# Patient Record
Sex: MALE | Race: ASIAN | NOT HISPANIC OR LATINO
[De-identification: names, ages, dates, MRNs, and addresses within clinical notes are randomized per-mention and may not be internally consistent; named-entity substitution may affect disease eponyms.]

---

## 2018-01-26 ENCOUNTER — APPOINTMENT (OUTPATIENT)
Dept: UROLOGY | Facility: CLINIC | Age: 63
End: 2018-01-26
Payer: MEDICAID

## 2018-01-26 VITALS
BODY MASS INDEX: 26.68 KG/M2 | HEART RATE: 56 BPM | WEIGHT: 170 LBS | DIASTOLIC BLOOD PRESSURE: 91 MMHG | SYSTOLIC BLOOD PRESSURE: 152 MMHG | RESPIRATION RATE: 16 BRPM | HEIGHT: 67 IN | TEMPERATURE: 98 F

## 2018-01-26 DIAGNOSIS — Z84.1 FAMILY HISTORY OF DISORDERS OF KIDNEY AND URETER: ICD-10-CM

## 2018-01-26 DIAGNOSIS — Z80.3 FAMILY HISTORY OF MALIGNANT NEOPLASM OF BREAST: ICD-10-CM

## 2018-01-26 DIAGNOSIS — N21.0 CALCULUS IN BLADDER: ICD-10-CM

## 2018-01-26 DIAGNOSIS — Z78.9 OTHER SPECIFIED HEALTH STATUS: ICD-10-CM

## 2018-01-26 PROCEDURE — 99205 OFFICE O/P NEW HI 60 MIN: CPT

## 2018-01-30 LAB
APPEARANCE: CLEAR
BACTERIA UR CULT: NORMAL
BACTERIA: NEGATIVE
BILIRUBIN URINE: NEGATIVE
BLOOD URINE: NEGATIVE
COLOR: YELLOW
GLUCOSE QUALITATIVE U: NEGATIVE MG/DL
HYALINE CASTS: 0 /LPF
KETONES URINE: NEGATIVE
LEUKOCYTE ESTERASE URINE: NEGATIVE
MICROSCOPIC-UA: NORMAL
NITRITE URINE: NEGATIVE
PH URINE: 7
PROTEIN URINE: NEGATIVE MG/DL
PSA FREE FLD-MCNC: 29.4
PSA FREE SERPL-MCNC: 0.55 NG/ML
PSA SERPL-MCNC: 1.87 NG/ML
RED BLOOD CELLS URINE: 1 /HPF
SPECIFIC GRAVITY URINE: 1.01
SQUAMOUS EPITHELIAL CELLS: 0 /HPF
UROBILINOGEN URINE: NEGATIVE MG/DL
WHITE BLOOD CELLS URINE: 0 /HPF

## 2018-02-01 ENCOUNTER — FORM ENCOUNTER (OUTPATIENT)
Age: 63
End: 2018-02-01

## 2018-02-02 ENCOUNTER — APPOINTMENT (OUTPATIENT)
Dept: ULTRASOUND IMAGING | Facility: IMAGING CENTER | Age: 63
End: 2018-02-02
Payer: MEDICAID

## 2018-02-02 ENCOUNTER — OUTPATIENT (OUTPATIENT)
Dept: OUTPATIENT SERVICES | Facility: HOSPITAL | Age: 63
LOS: 1 days | End: 2018-02-02
Payer: MEDICAID

## 2018-02-02 DIAGNOSIS — Z98.89 OTHER SPECIFIED POSTPROCEDURAL STATES: Chronic | ICD-10-CM

## 2018-02-02 DIAGNOSIS — N20.0 CALCULUS OF KIDNEY: ICD-10-CM

## 2018-02-02 DIAGNOSIS — N21.0 CALCULUS IN BLADDER: ICD-10-CM

## 2018-02-02 PROCEDURE — 76770 US EXAM ABDO BACK WALL COMP: CPT

## 2018-02-02 PROCEDURE — 76857 US EXAM PELVIC LIMITED: CPT

## 2018-02-02 PROCEDURE — 76770 US EXAM ABDO BACK WALL COMP: CPT | Mod: 26

## 2018-02-02 PROCEDURE — 76857 US EXAM PELVIC LIMITED: CPT | Mod: 26

## 2018-02-18 ENCOUNTER — TRANSCRIPTION ENCOUNTER (OUTPATIENT)
Age: 63
End: 2018-02-18

## 2018-02-23 ENCOUNTER — APPOINTMENT (OUTPATIENT)
Dept: UROLOGY | Facility: CLINIC | Age: 63
End: 2018-02-23
Payer: MEDICAID

## 2018-02-23 DIAGNOSIS — R39.15 URGENCY OF URINATION: ICD-10-CM

## 2018-02-23 DIAGNOSIS — N52.9 MALE ERECTILE DYSFUNCTION, UNSPECIFIED: ICD-10-CM

## 2018-02-23 DIAGNOSIS — N40.1 BENIGN PROSTATIC HYPERPLASIA WITH LOWER URINARY TRACT SYMPMS: ICD-10-CM

## 2018-02-23 DIAGNOSIS — N20.0 CALCULUS OF KIDNEY: ICD-10-CM

## 2018-02-23 DIAGNOSIS — N13.8 BENIGN PROSTATIC HYPERPLASIA WITH LOWER URINARY TRACT SYMPMS: ICD-10-CM

## 2018-02-23 PROCEDURE — 99214 OFFICE O/P EST MOD 30 MIN: CPT

## 2018-04-04 ENCOUNTER — APPOINTMENT (OUTPATIENT)
Dept: PULMONOLOGY | Facility: CLINIC | Age: 63
End: 2018-04-04

## 2018-07-24 PROBLEM — Z78.9 ALCOHOL USE: Status: INACTIVE | Noted: 2018-01-26

## 2018-10-02 ENCOUNTER — APPOINTMENT (OUTPATIENT)
Dept: PULMONOLOGY | Facility: CLINIC | Age: 63
End: 2018-10-02
Payer: MEDICAID

## 2018-10-02 ENCOUNTER — MED ADMIN CHARGE (OUTPATIENT)
Age: 63
End: 2018-10-02

## 2018-10-02 VITALS
RESPIRATION RATE: 18 BRPM | DIASTOLIC BLOOD PRESSURE: 83 MMHG | WEIGHT: 176 LBS | HEART RATE: 60 BPM | TEMPERATURE: 97.9 F | SYSTOLIC BLOOD PRESSURE: 146 MMHG | OXYGEN SATURATION: 98 % | BODY MASS INDEX: 28.28 KG/M2 | HEIGHT: 66 IN

## 2018-10-02 PROCEDURE — G0008: CPT

## 2018-10-02 PROCEDURE — 94729 DIFFUSING CAPACITY: CPT

## 2018-10-02 PROCEDURE — 36415 COLL VENOUS BLD VENIPUNCTURE: CPT

## 2018-10-02 PROCEDURE — 99205 OFFICE O/P NEW HI 60 MIN: CPT | Mod: 25

## 2018-10-02 PROCEDURE — 90686 IIV4 VACC NO PRSV 0.5 ML IM: CPT

## 2018-10-02 PROCEDURE — 94060 EVALUATION OF WHEEZING: CPT

## 2018-10-02 PROCEDURE — 99407 BEHAV CHNG SMOKING > 10 MIN: CPT

## 2018-10-02 PROCEDURE — ZZZZZ: CPT

## 2018-10-02 PROCEDURE — 94726 PLETHYSMOGRAPHY LUNG VOLUMES: CPT

## 2018-10-03 LAB
A1AT SERPL-MCNC: 148 MG/DL
ALBUMIN SERPL ELPH-MCNC: 4.2 G/DL
ALP BLD-CCNC: 84 U/L
ALT SERPL-CCNC: 21 U/L
ANION GAP SERPL CALC-SCNC: 17 MMOL/L
AST SERPL-CCNC: 22 U/L
BASOPHILS # BLD AUTO: 0.04 K/UL
BASOPHILS NFR BLD AUTO: 0.5 %
BILIRUB SERPL-MCNC: <0.2 MG/DL
BUN SERPL-MCNC: 15 MG/DL
CALCIUM SERPL-MCNC: 10.2 MG/DL
CHLORIDE SERPL-SCNC: 102 MMOL/L
CO2 SERPL-SCNC: 24 MMOL/L
CREAT SERPL-MCNC: 1.12 MG/DL
EOSINOPHIL # BLD AUTO: 0.18 K/UL
EOSINOPHIL NFR BLD AUTO: 2.2 %
GLUCOSE SERPL-MCNC: 84 MG/DL
HCT VFR BLD CALC: 47.4 %
HGB BLD-MCNC: 15 G/DL
IMM GRANULOCYTES NFR BLD AUTO: 0.4 %
LYMPHOCYTES # BLD AUTO: 3.67 K/UL
LYMPHOCYTES NFR BLD AUTO: 45.1 %
MAN DIFF?: NORMAL
MCHC RBC-ENTMCNC: 31.6 GM/DL
MCHC RBC-ENTMCNC: 31.6 PG
MCV RBC AUTO: 99.8 FL
MONOCYTES # BLD AUTO: 0.56 K/UL
MONOCYTES NFR BLD AUTO: 6.9 %
NEUTROPHILS # BLD AUTO: 3.65 K/UL
NEUTROPHILS NFR BLD AUTO: 44.9 %
PLATELET # BLD AUTO: 264 K/UL
POTASSIUM SERPL-SCNC: 4 MMOL/L
PROT SERPL-MCNC: 7.8 G/DL
RBC # BLD: 4.75 M/UL
RBC # FLD: 13.2 %
SODIUM SERPL-SCNC: 143 MMOL/L
TSH SERPL-ACNC: 0.71 UIU/ML
WBC # FLD AUTO: 8.13 K/UL

## 2018-10-09 ENCOUNTER — MEDICATION RENEWAL (OUTPATIENT)
Age: 63
End: 2018-10-09

## 2018-10-09 ENCOUNTER — CHART COPY (OUTPATIENT)
Age: 63
End: 2018-10-09

## 2018-10-19 ENCOUNTER — OUTPATIENT (OUTPATIENT)
Dept: OUTPATIENT SERVICES | Facility: HOSPITAL | Age: 63
LOS: 1 days | End: 2018-10-19
Payer: MEDICAID

## 2018-10-19 ENCOUNTER — APPOINTMENT (OUTPATIENT)
Dept: CT IMAGING | Facility: IMAGING CENTER | Age: 63
End: 2018-10-19
Payer: MEDICAID

## 2018-10-19 DIAGNOSIS — F17.200 NICOTINE DEPENDENCE, UNSPECIFIED, UNCOMPLICATED: ICD-10-CM

## 2018-10-19 DIAGNOSIS — Z98.89 OTHER SPECIFIED POSTPROCEDURAL STATES: Chronic | ICD-10-CM

## 2018-10-19 PROCEDURE — G0297: CPT

## 2018-10-19 PROCEDURE — G0297: CPT | Mod: 26

## 2018-12-07 ENCOUNTER — APPOINTMENT (OUTPATIENT)
Dept: PULMONOLOGY | Facility: CLINIC | Age: 63
End: 2018-12-07
Payer: MEDICAID

## 2018-12-07 VITALS
WEIGHT: 176 LBS | TEMPERATURE: 97.8 F | BODY MASS INDEX: 28.28 KG/M2 | HEART RATE: 65 BPM | HEIGHT: 66 IN | DIASTOLIC BLOOD PRESSURE: 86 MMHG | SYSTOLIC BLOOD PRESSURE: 128 MMHG | RESPIRATION RATE: 15 BRPM

## 2018-12-07 PROCEDURE — 99215 OFFICE O/P EST HI 40 MIN: CPT

## 2019-06-03 PROBLEM — N52.9 MALE ERECTILE DISORDER: Status: ACTIVE | Noted: 2018-01-30

## 2021-08-31 ENCOUNTER — APPOINTMENT (OUTPATIENT)
Dept: CARDIOLOGY | Facility: CLINIC | Age: 66
End: 2021-08-31
Payer: MEDICARE

## 2021-08-31 ENCOUNTER — NON-APPOINTMENT (OUTPATIENT)
Age: 66
End: 2021-08-31

## 2021-08-31 VITALS
OXYGEN SATURATION: 98 % | DIASTOLIC BLOOD PRESSURE: 72 MMHG | SYSTOLIC BLOOD PRESSURE: 127 MMHG | HEART RATE: 56 BPM | BODY MASS INDEX: 28.61 KG/M2 | WEIGHT: 178 LBS | HEIGHT: 66 IN

## 2021-08-31 PROCEDURE — 99205 OFFICE O/P NEW HI 60 MIN: CPT

## 2021-08-31 PROCEDURE — 93000 ELECTROCARDIOGRAM COMPLETE: CPT

## 2021-08-31 RX ORDER — TAMSULOSIN HYDROCHLORIDE 0.4 MG/1
0.4 CAPSULE ORAL
Qty: 30 | Refills: 11 | Status: COMPLETED | COMMUNITY
Start: 2018-01-26 | End: 2021-08-31

## 2021-08-31 RX ORDER — TIOTROPIUM BROMIDE 18 UG/1
18 CAPSULE ORAL; RESPIRATORY (INHALATION) DAILY
Qty: 1 | Refills: 2 | Status: COMPLETED | COMMUNITY
Start: 2018-10-04 | End: 2021-08-31

## 2021-08-31 RX ORDER — ROSUVASTATIN CALCIUM 40 MG/1
40 TABLET, FILM COATED ORAL DAILY
Qty: 30 | Refills: 0 | Status: COMPLETED | COMMUNITY
End: 2021-08-31

## 2021-08-31 RX ORDER — SILDENAFIL 20 MG/1
20 TABLET ORAL
Qty: 20 | Refills: 6 | Status: COMPLETED | COMMUNITY
Start: 2018-01-26 | End: 2021-08-31

## 2021-08-31 RX ORDER — TIOTROPIUM BROMIDE INHALATION SPRAY 3.12 UG/1
2.5 SPRAY, METERED RESPIRATORY (INHALATION) DAILY
Qty: 1 | Refills: 5 | Status: ACTIVE | COMMUNITY
Start: 2018-10-09 | End: 1900-01-01

## 2021-08-31 RX ORDER — DICLOFENAC SODIUM 50 MG/1
50 TABLET, DELAYED RELEASE ORAL
Refills: 0 | Status: ACTIVE | COMMUNITY
Start: 2021-08-31

## 2021-09-03 RX ORDER — AMLODIPINE BESYLATE 5 MG/1
5 TABLET ORAL DAILY
Qty: 90 | Refills: 3 | Status: ACTIVE | COMMUNITY
Start: 1900-01-01 | End: 1900-01-01

## 2021-09-03 RX ORDER — ASPIRIN 81 MG/1
81 TABLET, DELAYED RELEASE ORAL
Qty: 90 | Refills: 3 | Status: ACTIVE | COMMUNITY
Start: 2021-08-31 | End: 1900-01-01

## 2021-09-03 RX ORDER — ATORVASTATIN CALCIUM 40 MG/1
40 TABLET, FILM COATED ORAL DAILY
Qty: 90 | Refills: 3 | Status: ACTIVE | COMMUNITY
Start: 2021-08-31 | End: 1900-01-01

## 2021-09-16 ENCOUNTER — APPOINTMENT (OUTPATIENT)
Dept: CARDIOLOGY | Facility: CLINIC | Age: 66
End: 2021-09-16

## 2021-09-24 NOTE — DISCUSSION/SUMMARY
[FreeTextEntry1] : Mr. Nazario presents with exertional dyspnea\par \par Plan:\par 1. Given the symptoms and dyspnea it is appropriate to proceed with angiography, however should see Dr. Allison as well for f/u\par 2. c/w current meds\par 3. Pt and family agree to the plan\par 4. Old records requested and reviewed with performing physician (via [place])\par 5. Primary and secondary prevention of cardiovascular and related conditions discussed at length, including but not limited to diet and lifestyle modification.\par 6. Patient to return to the office in 2-3 months.\par \par Thank you for allowing me to participate in the care of your patient. If you have any questions, please feel free to contact me at [phone] or via email at [email or web address].\par \par Sincerely,\par \par Silvino Smallwood MD [facility]\par  of Cardiology\par Director, Cardiac Catheterization Laboratory\par Director, CHIP/ Program\par [facility]\par [facility]\par Tel: [phone]\par Email: [email or web address]

## 2021-09-24 NOTE — HISTORY OF PRESENT ILLNESS
[FreeTextEntry1] : Mr. Nazario os a 66 year-old man with known exertional dyspnea who was being seen by Dr. Allison but has not followed up and had a stress test in January which demonstrated perfusion defects.

## 2021-10-28 ENCOUNTER — APPOINTMENT (OUTPATIENT)
Dept: CARDIOLOGY | Facility: CLINIC | Age: 66
End: 2021-10-28
Payer: MEDICARE

## 2021-10-28 PROCEDURE — 93306 TTE W/DOPPLER COMPLETE: CPT

## 2021-11-02 ENCOUNTER — APPOINTMENT (OUTPATIENT)
Dept: PULMONOLOGY | Facility: CLINIC | Age: 66
End: 2021-11-02
Payer: MEDICARE

## 2021-11-02 VITALS
TEMPERATURE: 97.5 F | BODY MASS INDEX: 27.64 KG/M2 | HEIGHT: 66 IN | OXYGEN SATURATION: 98 % | WEIGHT: 172 LBS | HEART RATE: 68 BPM | SYSTOLIC BLOOD PRESSURE: 126 MMHG | RESPIRATION RATE: 15 BRPM | DIASTOLIC BLOOD PRESSURE: 76 MMHG

## 2021-11-02 DIAGNOSIS — G47.9 SLEEP DISORDER, UNSPECIFIED: ICD-10-CM

## 2021-11-02 PROCEDURE — 99215 OFFICE O/P EST HI 40 MIN: CPT | Mod: 25

## 2021-11-02 NOTE — HISTORY OF PRESENT ILLNESS
[Nonrestorative Sleep] : nonrestorative sleep [Snoring] : snoring [ESS] : 16 [FreeTextEntry1] : This letter  is regarding your patient  who  attended pulmonary out patient office today.  I have reviewed  patient's  past history, social history, family history and medication list. I also  reviewed nurse practitioners/ and fellows  notes and assessment and agree with it.  ---------\par \par No history of fever, chills , rigors, chest pain, or hemoptysis. Questionable history of Raynaud's phenomenon. No h/o significant weight loss in last few months. No history of liver dysfunction, collagen vascular disorder or chronic thromboembolic disease. I would classify her dyspnea as WHO  FUNCTIONAL CLASS II\par \par Has exertional dyspneic for nearly two years. Current smoker (1 cig / day at most) but formerly 1 PPD for approx 20 years (cut back 25 years ago). Follows with cardiologist (Dr. Montoya). Recently had cardiac stress test (3 months ago approx) which is normal per patient. Does not use inhalers. 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 No BD response\par ---dec 2108---doing well on spiriva\par \par NOV 2021---------- past history of smoking, was on inhaler but stopped on its own being considered for  CARDIAC CATH----- Needs PFT CT scan chest---CATH---\par c/o sleep disturbance, snoring---,,,,------

## 2021-11-02 NOTE — REVIEW OF SYSTEMS
[Dyspnea] : dyspnea [As Noted in HPI] : as noted in HPI [Fever] : no fever [Dry Eyes] : no dryness of the eyes [Hypotension] : no hypotension [Chest Discomfort] : no chest discomfort [Hay Fever] : no hay fever [Itchy Eyes] : no itching of ~T the eyes [Heartburn] : no heartburn [Reflux] : no reflux [Dysuria] : no dysuria [Trauma] : no ~T physical trauma [Raynaud] : no Raynaud's phenomenon was observed [Scleroderma] : no scleroderma [Easy Bruising] : no ~M tendency for easy bruising [Headache] : no headache [Diet Meds] : not taking dietary supplements [Difficulty Initiating Sleep] : no difficulty falling asleep

## 2021-11-02 NOTE — ASSESSMENT
[FreeTextEntry1] : ASSESSMENT  PLAN------ 63 year old male, active smoker, with history of hyperlipidemia presented  with exertional dyspnea. PFTs   2018 consistent with mixed obstructive and restrictive defect. --\par  past history of smoking [ HAS COPD] , was on inhaler but stopped on its own   AS PER CARDIOLOGY   Being considered for  CARDIAC CATH----- Needs PFT CT scan chest---CATH----\par \par --\par 1- cancer screening CT  ---WILL REPEAT CT CHEST   \par 2-START BREZTRI INHALER , REPEAT PFT \par 3 F/U CARDIOLOGY DR HERNANDEZ \par 4- UPTO DATE ON Influenza vaccine \par 5 LABS\par 6- F/U IN JAN 2022\par 7- has features of RADHA- will get HST , ESS16\par \par \par Thanks for allowing  me to participate  in the care of this patient.  Patient at this time  will follow  the above mentioned recommendations and return back for follow up visit. If you have any questions  I can be reached  at # [phone] (office).\par \par Emily Allison MD, FCCP \par Director, Pulmonary Hypertension Program \par [facility] \par Division of Pulmonary, Critical Care and Sleep Medicine \par  Professor of Medicine \par [employer]\par \par RENEE JacksonC\par \par \par \par

## 2021-11-02 NOTE — PHYSICAL EXAM
[General Appearance - Well Developed] : well developed [General Appearance - Well Nourished] : well nourished [Normal Conjunctiva] : the conjunctiva exhibited no abnormalities [III] : III [Heart Rate And Rhythm] : heart rate and rhythm were normal [Heart Sounds] : normal S1 and S2 [Murmurs] : no murmurs present [Edema] : no peripheral edema present [Respiration, Rhythm And Depth] : normal respiratory rhythm and effort [Exaggerated Use Of Accessory Muscles For Inspiration] : no accessory muscle use [Auscultation Breath Sounds / Voice Sounds] : lungs were clear to auscultation bilaterally [Abdomen Soft] : soft [Abdomen Tenderness] : non-tender [Abnormal Walk] : normal gait [Nail Clubbing] : no clubbing of the fingernails [Cyanosis, Localized] : no localized cyanosis [Skin Turgor] : normal skin turgor [Cranial Nerves] : cranial nerves 2-12 were intact [Oriented To Time, Place, And Person] : oriented to person, place, and time [Skin Color & Pigmentation] : normal skin color and pigmentation [] : no rash [FreeTextEntry1] : NO SPINE TENDERNESS

## 2021-11-03 LAB
ALBUMIN SERPL ELPH-MCNC: 4.6 G/DL
ALP BLD-CCNC: 91 U/L
ALT SERPL-CCNC: 27 U/L
ANION GAP SERPL CALC-SCNC: 18 MMOL/L
AST SERPL-CCNC: 19 U/L
BASOPHILS # BLD AUTO: 0.06 K/UL
BASOPHILS NFR BLD AUTO: 0.7 %
BILIRUB SERPL-MCNC: 0.2 MG/DL
BUN SERPL-MCNC: 12 MG/DL
CALCIUM SERPL-MCNC: 9.7 MG/DL
CHLORIDE SERPL-SCNC: 101 MMOL/L
CO2 SERPL-SCNC: 23 MMOL/L
COVID-19 NUCLEOCAPSID  GAM ANTIBODY INTERPRETATION: NEGATIVE
COVID-19 SPIKE DOMAIN ANTIBODY INTERPRETATION: POSITIVE
CREAT SERPL-MCNC: 1.08 MG/DL
DEPRECATED KAPPA LC FREE/LAMBDA SER: 1.08 RATIO
EOSINOPHIL # BLD AUTO: 0.26 K/UL
EOSINOPHIL NFR BLD AUTO: 2.9 %
GLUCOSE SERPL-MCNC: 96 MG/DL
HCT VFR BLD CALC: 46.3 %
HGB BLD-MCNC: 14.9 G/DL
IGA SER QL IEP: 207 MG/DL
IGG SER QL IEP: 1198 MG/DL
IGM SER QL IEP: 56 MG/DL
IMM GRANULOCYTES NFR BLD AUTO: 0.2 %
KAPPA LC CSF-MCNC: 1.47 MG/DL
KAPPA LC SERPL-MCNC: 1.59 MG/DL
LYMPHOCYTES # BLD AUTO: 3.81 K/UL
LYMPHOCYTES NFR BLD AUTO: 43.1 %
MAN DIFF?: NORMAL
MCHC RBC-ENTMCNC: 32.1 PG
MCHC RBC-ENTMCNC: 32.2 GM/DL
MCV RBC AUTO: 99.8 FL
MONOCYTES # BLD AUTO: 0.56 K/UL
MONOCYTES NFR BLD AUTO: 6.3 %
NEUTROPHILS # BLD AUTO: 4.12 K/UL
NEUTROPHILS NFR BLD AUTO: 46.8 %
PLATELET # BLD AUTO: 282 K/UL
POTASSIUM SERPL-SCNC: 4.1 MMOL/L
PROT SERPL-MCNC: 7.4 G/DL
RBC # BLD: 4.64 M/UL
RBC # FLD: 12.4 %
SARS-COV-2 AB SERPL IA-ACNC: >250 U/ML
SARS-COV-2 AB SERPL QL IA: 0.08 INDEX
SODIUM SERPL-SCNC: 141 MMOL/L
TOTAL IGE SMQN RAST: 222 KU/L
WBC # FLD AUTO: 8.83 K/UL

## 2021-11-09 ENCOUNTER — APPOINTMENT (OUTPATIENT)
Dept: CARDIOLOGY | Facility: CLINIC | Age: 66
End: 2021-11-09
Payer: MEDICARE

## 2021-11-09 VITALS
OXYGEN SATURATION: 96 % | WEIGHT: 175 LBS | SYSTOLIC BLOOD PRESSURE: 127 MMHG | DIASTOLIC BLOOD PRESSURE: 82 MMHG | HEART RATE: 60 BPM | HEIGHT: 66 IN | BODY MASS INDEX: 28.12 KG/M2

## 2021-11-09 PROCEDURE — 99215 OFFICE O/P EST HI 40 MIN: CPT

## 2021-11-09 PROCEDURE — 93000 ELECTROCARDIOGRAM COMPLETE: CPT

## 2021-11-09 NOTE — HISTORY OF PRESENT ILLNESS
[FreeTextEntry1] : Mr Nazario is a 66 year old man with known exertional dyspnea who was being seen by Dr. Allison but has not followed up and had a stress test in January which demonstrated perfusion defects. He continues to have class II exertional dyspnea but otherwise stable. Saw Dr Allison recently and planned for repeat PFT and screening CT chest. We discussed proceeding with invasive coronary angiogram and Mr Nazario is agreeable.

## 2021-11-09 NOTE — CARDIOLOGY SUMMARY
[de-identified] : 11/9/2021 - SR [de-identified] : June 2021 - apical ischemia [de-identified] : Oct 2021 - Normal LV function, no significant valvular abnormalities.

## 2021-11-09 NOTE — DISCUSSION/SUMMARY
[Outpatient Evaluation] : outpatient evaluation [None] : none [Patient] : the patient [FreeTextEntry1] : Plan:\par 1. schedule for left heart catheterization - patient understands the r/a/b/c/i of the plan\par 2. repeat lipid profile (will do this on the day of procedure)\par 3. ongoing follow-up with Dr Allison\par 4. discussed non-pharmacological measures including exercise + weight loss\par 5. Old records requested and reviewed with performing physician.\par 6. Primary and secondary prevention of cardiovascular and related conditions discussed at length, including but not limited to diet and lifestyle modification.\par 7. Patient to return to the office in 3 months.\par \par Thank you for allowing me to participate in the care of your patient. If you have any questions, please feel free to contact me at [phone] or via email at [email or web address].\par \par Sincerely,\par \par Silvino Smallwood MD [facility]\par Director of Cardiac Catheterization Laboratory\par Director CHIP/ Program\par [facility] Lead Interventional Cardiology\par [facility]\par [facility]

## 2021-11-13 ENCOUNTER — APPOINTMENT (OUTPATIENT)
Dept: DISASTER EMERGENCY | Facility: CLINIC | Age: 66
End: 2021-11-13

## 2021-11-13 DIAGNOSIS — Z01.818 ENCOUNTER FOR OTHER PREPROCEDURAL EXAMINATION: ICD-10-CM

## 2021-11-14 LAB — SARS-COV-2 N GENE NPH QL NAA+PROBE: NOT DETECTED

## 2021-11-16 ENCOUNTER — OUTPATIENT (OUTPATIENT)
Dept: OUTPATIENT SERVICES | Facility: HOSPITAL | Age: 66
LOS: 1 days | End: 2021-11-16
Payer: COMMERCIAL

## 2021-11-16 VITALS
HEIGHT: 68 IN | DIASTOLIC BLOOD PRESSURE: 88 MMHG | TEMPERATURE: 98 F | SYSTOLIC BLOOD PRESSURE: 161 MMHG | WEIGHT: 171.96 LBS | HEART RATE: 62 BPM | RESPIRATION RATE: 16 BRPM | OXYGEN SATURATION: 97 %

## 2021-11-16 VITALS
RESPIRATION RATE: 20 BRPM | OXYGEN SATURATION: 97 % | SYSTOLIC BLOOD PRESSURE: 154 MMHG | DIASTOLIC BLOOD PRESSURE: 94 MMHG | HEART RATE: 67 BPM

## 2021-11-16 DIAGNOSIS — Z98.89 OTHER SPECIFIED POSTPROCEDURAL STATES: Chronic | ICD-10-CM

## 2021-11-16 DIAGNOSIS — R94.39 ABNORMAL RESULT OF OTHER CARDIOVASCULAR FUNCTION STUDY: ICD-10-CM

## 2021-11-16 LAB
ALBUMIN SERPL ELPH-MCNC: 4.4 G/DL — SIGNIFICANT CHANGE UP (ref 3.3–5)
ALP SERPL-CCNC: 84 U/L — SIGNIFICANT CHANGE UP (ref 40–120)
ALT FLD-CCNC: 25 U/L — SIGNIFICANT CHANGE UP (ref 10–45)
ANION GAP SERPL CALC-SCNC: 14 MMOL/L — SIGNIFICANT CHANGE UP (ref 5–17)
AST SERPL-CCNC: 40 U/L — SIGNIFICANT CHANGE UP (ref 10–40)
BILIRUB SERPL-MCNC: 0.4 MG/DL — SIGNIFICANT CHANGE UP (ref 0.2–1.2)
BUN SERPL-MCNC: 14 MG/DL — SIGNIFICANT CHANGE UP (ref 7–23)
CALCIUM SERPL-MCNC: 9.5 MG/DL — SIGNIFICANT CHANGE UP (ref 8.4–10.5)
CHLORIDE SERPL-SCNC: 103 MMOL/L — SIGNIFICANT CHANGE UP (ref 96–108)
CHOLEST SERPL-MCNC: 149 MG/DL — SIGNIFICANT CHANGE UP
CO2 SERPL-SCNC: 21 MMOL/L — LOW (ref 22–31)
CREAT SERPL-MCNC: 0.92 MG/DL — SIGNIFICANT CHANGE UP (ref 0.5–1.3)
GLUCOSE SERPL-MCNC: 83 MG/DL — SIGNIFICANT CHANGE UP (ref 70–99)
HCT VFR BLD CALC: 44.6 % — SIGNIFICANT CHANGE UP (ref 39–50)
HDLC SERPL-MCNC: 54 MG/DL — SIGNIFICANT CHANGE UP
HGB BLD-MCNC: 14.5 G/DL — SIGNIFICANT CHANGE UP (ref 13–17)
LIPID PNL WITH DIRECT LDL SERPL: 59 MG/DL — SIGNIFICANT CHANGE UP
MCHC RBC-ENTMCNC: 31.7 PG — SIGNIFICANT CHANGE UP (ref 27–34)
MCHC RBC-ENTMCNC: 32.5 GM/DL — SIGNIFICANT CHANGE UP (ref 32–36)
MCV RBC AUTO: 97.4 FL — SIGNIFICANT CHANGE UP (ref 80–100)
NON HDL CHOLESTEROL: 94 MG/DL — SIGNIFICANT CHANGE UP
NRBC # BLD: 0 /100 WBCS — SIGNIFICANT CHANGE UP (ref 0–0)
PLATELET # BLD AUTO: 293 K/UL — SIGNIFICANT CHANGE UP (ref 150–400)
POTASSIUM SERPL-MCNC: 6.1 MMOL/L — HIGH (ref 3.5–5.3)
POTASSIUM SERPL-SCNC: 6.1 MMOL/L — HIGH (ref 3.5–5.3)
PROT SERPL-MCNC: 7.5 G/DL — SIGNIFICANT CHANGE UP (ref 6–8.3)
RBC # BLD: 4.58 M/UL — SIGNIFICANT CHANGE UP (ref 4.2–5.8)
RBC # FLD: 12.3 % — SIGNIFICANT CHANGE UP (ref 10.3–14.5)
SODIUM SERPL-SCNC: 138 MMOL/L — SIGNIFICANT CHANGE UP (ref 135–145)
TRIGL SERPL-MCNC: 178 MG/DL — HIGH
WBC # BLD: 10.2 K/UL — SIGNIFICANT CHANGE UP (ref 3.8–10.5)
WBC # FLD AUTO: 10.2 K/UL — SIGNIFICANT CHANGE UP (ref 3.8–10.5)

## 2021-11-16 PROCEDURE — 93010 ELECTROCARDIOGRAM REPORT: CPT

## 2021-11-16 PROCEDURE — 93454 CORONARY ARTERY ANGIO S&I: CPT

## 2021-11-16 PROCEDURE — C1894: CPT

## 2021-11-16 PROCEDURE — 85027 COMPLETE CBC AUTOMATED: CPT

## 2021-11-16 PROCEDURE — 80053 COMPREHEN METABOLIC PANEL: CPT

## 2021-11-16 PROCEDURE — 80061 LIPID PANEL: CPT

## 2021-11-16 PROCEDURE — C1887: CPT

## 2021-11-16 PROCEDURE — 99152 MOD SED SAME PHYS/QHP 5/>YRS: CPT

## 2021-11-16 PROCEDURE — C1769: CPT

## 2021-11-16 PROCEDURE — 93454 CORONARY ARTERY ANGIO S&I: CPT | Mod: 26

## 2021-11-16 PROCEDURE — 93005 ELECTROCARDIOGRAM TRACING: CPT

## 2021-11-16 RX ORDER — SODIUM CHLORIDE 9 MG/ML
3 INJECTION INTRAMUSCULAR; INTRAVENOUS; SUBCUTANEOUS EVERY 8 HOURS
Refills: 0 | Status: DISCONTINUED | OUTPATIENT
Start: 2021-11-16 | End: 2021-11-30

## 2021-11-16 NOTE — H&P CARDIOLOGY - HISTORY OF PRESENT ILLNESS
66 yr old male with no implantable cardiac monitoring device and PMH of former smoker, htn, hld, kidney stone s/p lithotripsy presents today for cardiac catheterization. Patient reports COYNE for the past 2 years and has noticed intermittent  left sided CP, non radiating at rest over the past 6 months. Evaluated by Dr Smallwood where NST in June 2021 showed apical ischemia.    TTE 10/28/21 min MR  Grossly normal LV systolic function. EF 62%    Referred today for C 66 yr old male with no implantable cardiac monitoring device and PMH of former smoker, htn, hld, kidney stone s/p lithotripsy presents today for cardiac catheterization. Patient reports COYNE for the past 2 years and has noticed intermittent  left sided CP, non radiating at rest over the past 6 months. Underwent  NST on 6/29/21 at [facility] which showed small reversible perfusion defect of the apex.  Subsequently was evaluated by Dr Smallwood where TTE was done 10/28/21 showing -  Min MR  Grossly normal LV systolic function. EF 62%      Referred today for [facility]

## 2021-11-16 NOTE — H&P CARDIOLOGY - NSICDXPASTMEDICALHX_GEN_ALL_CORE_FT
PAST MEDICAL HISTORY:  Former smoker     Gallstone     Hypercholesterolemia     Hyperthyroidism     Kidney stone

## 2021-11-16 NOTE — H&P CARDIOLOGY - NSICDXFAMILYHX_GEN_ALL_CORE_FT
FAMILY HISTORY:  Father  Still living? Unknown  Family history of hypertension in father, Age at diagnosis: Age Unknown  Family history of kidney stone, Age at diagnosis: Age Unknown

## 2021-11-16 NOTE — ASU DISCHARGE PLAN (ADULT/PEDIATRIC) - CARE PROVIDER_API CALL
Silvino Smallwood)  Cardiology; Internal Medicine; Interventional Cardiology  [facility]- Dept of Cardiology, [address] [place] [address]  Phone: [phone]  Fax: [phone]  Established Patient  Follow Up Time: 2 weeks

## 2021-11-17 PROBLEM — Z87.891 PERSONAL HISTORY OF NICOTINE DEPENDENCE: Chronic | Status: ACTIVE | Noted: 2021-11-16

## 2021-11-22 ENCOUNTER — NON-APPOINTMENT (OUTPATIENT)
Age: 66
End: 2021-11-22

## 2021-11-22 ENCOUNTER — APPOINTMENT (OUTPATIENT)
Dept: THORACIC SURGERY | Facility: CLINIC | Age: 66
End: 2021-11-22
Payer: MEDICARE

## 2021-11-22 VITALS — HEIGHT: 66 IN | BODY MASS INDEX: 27.64 KG/M2 | WEIGHT: 172 LBS

## 2021-11-22 DIAGNOSIS — Z12.2 ENCOUNTER FOR SCREENING FOR MALIGNANT NEOPLASM OF RESPIRATORY ORGANS: ICD-10-CM

## 2021-11-22 PROCEDURE — G0296 VISIT TO DETERM LDCT ELIG: CPT

## 2021-11-26 ENCOUNTER — OUTPATIENT (OUTPATIENT)
Dept: OUTPATIENT SERVICES | Facility: HOSPITAL | Age: 66
LOS: 1 days | End: 2021-11-26
Payer: COMMERCIAL

## 2021-11-26 ENCOUNTER — APPOINTMENT (OUTPATIENT)
Dept: CT IMAGING | Facility: IMAGING CENTER | Age: 66
End: 2021-11-26
Payer: MEDICARE

## 2021-11-26 DIAGNOSIS — Z98.89 OTHER SPECIFIED POSTPROCEDURAL STATES: Chronic | ICD-10-CM

## 2021-11-26 DIAGNOSIS — R05.9 COUGH, UNSPECIFIED: ICD-10-CM

## 2021-11-26 PROCEDURE — 71271 CT THORAX LUNG CANCER SCR C-: CPT

## 2021-11-26 PROCEDURE — 71271 CT THORAX LUNG CANCER SCR C-: CPT | Mod: 26

## 2021-11-30 ENCOUNTER — TRANSCRIPTION ENCOUNTER (OUTPATIENT)
Age: 66
End: 2021-11-30

## 2021-12-02 NOTE — HISTORY OF PRESENT ILLNESS
[TextBox_13] : He denies hemoptysis, denies new cough, denies unexplained weight loss.\par He is a former smoker with a 27 pack year smoking history (0.75PPD x 36 years).  He quit 11 years ago.  He is referred by Dr. Kayla Allison.

## 2021-12-09 ENCOUNTER — APPOINTMENT (OUTPATIENT)
Dept: SLEEP CENTER | Facility: CLINIC | Age: 66
End: 2021-12-09
Payer: MEDICARE

## 2021-12-09 ENCOUNTER — OUTPATIENT (OUTPATIENT)
Dept: OUTPATIENT SERVICES | Facility: HOSPITAL | Age: 66
LOS: 1 days | End: 2021-12-09
Payer: COMMERCIAL

## 2021-12-09 DIAGNOSIS — Z98.89 OTHER SPECIFIED POSTPROCEDURAL STATES: Chronic | ICD-10-CM

## 2021-12-09 PROCEDURE — 95806 SLEEP STUDY UNATT&RESP EFFT: CPT

## 2021-12-09 PROCEDURE — 95806 SLEEP STUDY UNATT&RESP EFFT: CPT | Mod: 26

## 2021-12-14 ENCOUNTER — APPOINTMENT (OUTPATIENT)
Dept: CARDIOLOGY | Facility: CLINIC | Age: 66
End: 2021-12-14
Payer: MEDICARE

## 2021-12-14 VITALS
WEIGHT: 172 LBS | HEIGHT: 66 IN | HEART RATE: 57 BPM | DIASTOLIC BLOOD PRESSURE: 81 MMHG | BODY MASS INDEX: 27.64 KG/M2 | OXYGEN SATURATION: 96 % | SYSTOLIC BLOOD PRESSURE: 126 MMHG

## 2021-12-14 DIAGNOSIS — F17.200 NICOTINE DEPENDENCE, UNSPECIFIED, UNCOMPLICATED: ICD-10-CM

## 2021-12-14 PROCEDURE — 99214 OFFICE O/P EST MOD 30 MIN: CPT

## 2021-12-14 PROCEDURE — 93000 ELECTROCARDIOGRAM COMPLETE: CPT

## 2021-12-14 NOTE — HISTORY OF PRESENT ILLNESS
[FreeTextEntry1] : Mr Nazario is a 66 year old man with known exertional dyspnea who was being seen by Dr. Allison but has not followed up and had a stress test in January which demonstrated perfusion defects. He continues to have class II exertional dyspnea but otherwise stable. Saw Dr Allison recently and planned for repeat PFT and screening CT chest. We discussed proceeding with invasive coronary angiogram and Mr Nazario is agreeable. \par \par Coronary angiogram was performed on Nov 16 which showed minor non-obstructive epicardial coronary disease. He felt reassured after the angiogram and has not been experience any significant cardiac symptoms. Fasting LDL done at the time of angiogram was 59 mg/dL but TG mildly elevated 178 mg/dL. Recent sleep study showed mild RADHA (AHI 4.4). We discussed management goals moving forward with focus on non-pharmacological measures including weight loss.

## 2021-12-16 DIAGNOSIS — G47.33 OBSTRUCTIVE SLEEP APNEA (ADULT) (PEDIATRIC): ICD-10-CM

## 2021-12-22 ENCOUNTER — APPOINTMENT (OUTPATIENT)
Dept: SLEEP CENTER | Facility: CLINIC | Age: 66
End: 2021-12-22

## 2022-01-20 ENCOUNTER — APPOINTMENT (OUTPATIENT)
Dept: PULMONOLOGY | Facility: CLINIC | Age: 67
End: 2022-01-20
Payer: MEDICARE

## 2022-01-20 VITALS
HEART RATE: 71 BPM | HEIGHT: 66 IN | DIASTOLIC BLOOD PRESSURE: 80 MMHG | OXYGEN SATURATION: 97 % | SYSTOLIC BLOOD PRESSURE: 150 MMHG | TEMPERATURE: 98 F | WEIGHT: 171 LBS | BODY MASS INDEX: 27.48 KG/M2

## 2022-01-20 VITALS
HEART RATE: 68 BPM | TEMPERATURE: 98 F | WEIGHT: 171 LBS | OXYGEN SATURATION: 98 % | HEIGHT: 66 IN | SYSTOLIC BLOOD PRESSURE: 137 MMHG | RESPIRATION RATE: 15 BRPM | DIASTOLIC BLOOD PRESSURE: 86 MMHG | BODY MASS INDEX: 27.48 KG/M2

## 2022-01-20 DIAGNOSIS — R05.9 COUGH, UNSPECIFIED: ICD-10-CM

## 2022-01-20 PROCEDURE — ZZZZZ: CPT

## 2022-01-20 PROCEDURE — 99214 OFFICE O/P EST MOD 30 MIN: CPT | Mod: 25

## 2022-01-20 PROCEDURE — 94010 BREATHING CAPACITY TEST: CPT

## 2022-01-20 PROCEDURE — 94726 PLETHYSMOGRAPHY LUNG VOLUMES: CPT

## 2022-01-20 PROCEDURE — 94729 DIFFUSING CAPACITY: CPT

## 2022-01-20 RX ORDER — BUDESONIDE, GLYCOPYRROLATE, AND FORMOTEROL FUMARATE 160; 9; 4.8 UG/1; UG/1; UG/1
160-9-4.8 AEROSOL, METERED RESPIRATORY (INHALATION)
Qty: 1 | Refills: 2 | Status: ACTIVE | COMMUNITY
Start: 2021-11-02 | End: 1900-01-01

## 2022-01-20 NOTE — HISTORY OF PRESENT ILLNESS
[Nonrestorative Sleep] : nonrestorative sleep [Snoring] : snoring [TextBox_4] : This letter  is regarding your patient  who  attended pulmonary out patient office today.  I have reviewed  patient's  past history, social history, family history and medication list. I also  reviewed nurse practitioners/ and fellows  notes and assessment and agree with it.  ---------\par \par No history of fever, chills , rigors, chest pain, or hemoptysis. Questionable history of Raynaud's phenomenon. No h/o significant weight loss in last few months. No history of liver dysfunction, collagen vascular disorder or chronic thromboembolic disease. I would classify her dyspnea as WHO  FUNCTIONAL CLASS II\par \par Has exertional dyspneic for nearly two years. Current smoker (1 cig / day at most) but formerly 1 PPD for approx 20 years (cut back 25 years ago). Follows with cardiologist (Dr. Montoya). Recently had cardiac stress test (3 months ago approx) which is normal per patient. Does not use inhalers. 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 No BD response\par PFT  2022- COMBINE RESTRICTIVE AND OBSTRUCTIVE DEFECT\par -RADHA   DEEPALI  4.4  \par \par Clear lung fields-----CT CHEST 2021----\par \par --dec 2108---doing well on spiriva\par \par NOV 2021---------- past history of smoking, was on inhaler but stopped on its own being considered for  CARDIAC CATH----- Needs PFT CT scan chest---CATH---\par c/o sleep disturbance, snoring---,,,,------\par \par CATH AT [facility] -----minor irregularITY IN  THE VESSELS----advised medical treatment following up with cardiologist\par \par JAN 2022----feels much better-------- no respiratory complaints -----PFTs shows combined restrictive and obstructive defect.  Cardiopulmonary exercise test-------- no evidence of significant sleep apnea [ESS] : 16

## 2022-01-20 NOTE — ASSESSMENT
[FreeTextEntry1] : ASSESSMENT  PLAN------ 67 year old male, active smoker, with history of hyperlipidemia presented  with exertional dyspnea. PFTs   2018 consistent with mixed obstructive and restrictive defect. --\par  past history of smoking [ HAS COPD] , was on inhaler but stopped on its own     cardiac cath shows no significant coronary artery disease ------ PFT shows combined obstructive and restrictive defects------ sleep study does not show any significant sleep-\par \par \par 1-  CT CHEST   --CLEAR LUNGS  \par 2-START BREZTRI INHALER , REPEAT PFT \par 3 F/U CARDIOLOGY DR HERNANDEZ \par 4- UPTO DATE ON Influenza vaccine \par 5 LABS\par 6- F/U IN MAY   2022\par 7- PSG----no evidence of significant sleep apnea\par We will obtain cardiopulmonary exercise test ----\par \par \par \par Thanks for allowing  me to participate  in the care of this patient.  Patient at this time  will follow  the above mentioned recommendations and return back for follow up visit. If you have any questions  I can be reached  at # [phone] (office).\par \par Emily Allison MD, FCCP \par Director, Pulmonary Hypertension Program \par [facility] \par Division of Pulmonary, Critical Care and Sleep Medicine \par  Professor of Medicine \par [employer]\par \par ROMMEL Jackson\par \par \par \par

## 2022-01-21 LAB
ALBUMIN SERPL ELPH-MCNC: 4.6 G/DL
ALP BLD-CCNC: 91 U/L
ALT SERPL-CCNC: 24 U/L
ANION GAP SERPL CALC-SCNC: 13 MMOL/L
AST SERPL-CCNC: 20 U/L
BASOPHILS # BLD AUTO: 0.05 K/UL
BASOPHILS NFR BLD AUTO: 0.6 %
BILIRUB SERPL-MCNC: 0.3 MG/DL
BUN SERPL-MCNC: 18 MG/DL
CALCIUM SERPL-MCNC: 9.6 MG/DL
CHLORIDE SERPL-SCNC: 106 MMOL/L
CO2 SERPL-SCNC: 22 MMOL/L
CREAT SERPL-MCNC: 1.04 MG/DL
EOSINOPHIL # BLD AUTO: 0.27 K/UL
EOSINOPHIL NFR BLD AUTO: 3.1 %
GLUCOSE SERPL-MCNC: 80 MG/DL
HCT VFR BLD CALC: 46.3 %
HGB BLD-MCNC: 15 G/DL
IMM GRANULOCYTES NFR BLD AUTO: 0.3 %
LYMPHOCYTES # BLD AUTO: 3.85 K/UL
LYMPHOCYTES NFR BLD AUTO: 44.6 %
MAN DIFF?: NORMAL
MCHC RBC-ENTMCNC: 31.5 PG
MCHC RBC-ENTMCNC: 32.4 GM/DL
MCV RBC AUTO: 97.3 FL
MONOCYTES # BLD AUTO: 0.64 K/UL
MONOCYTES NFR BLD AUTO: 7.4 %
NEUTROPHILS # BLD AUTO: 3.79 K/UL
NEUTROPHILS NFR BLD AUTO: 44 %
PLATELET # BLD AUTO: 280 K/UL
POTASSIUM SERPL-SCNC: 4.3 MMOL/L
PROT SERPL-MCNC: 7.1 G/DL
RBC # BLD: 4.76 M/UL
RBC # FLD: 12.6 %
SODIUM SERPL-SCNC: 141 MMOL/L
TOTAL IGE SMQN RAST: 561 KU/L
WBC # FLD AUTO: 8.63 K/UL

## 2022-01-24 ENCOUNTER — APPOINTMENT (OUTPATIENT)
Dept: GASTROENTEROLOGY | Facility: CLINIC | Age: 67
End: 2022-01-24
Payer: MEDICARE

## 2022-01-24 VITALS
HEIGHT: 68 IN | WEIGHT: 172 LBS | SYSTOLIC BLOOD PRESSURE: 147 MMHG | HEART RATE: 58 BPM | BODY MASS INDEX: 26.07 KG/M2 | OXYGEN SATURATION: 98 % | TEMPERATURE: 97 F | DIASTOLIC BLOOD PRESSURE: 85 MMHG

## 2022-01-24 DIAGNOSIS — R12 HEARTBURN: ICD-10-CM

## 2022-01-24 DIAGNOSIS — Z12.11 ENCOUNTER FOR SCREENING FOR MALIGNANT NEOPLASM OF COLON: ICD-10-CM

## 2022-01-24 PROCEDURE — 99205 OFFICE O/P NEW HI 60 MIN: CPT

## 2022-01-24 RX ORDER — SODIUM SULFATE, POTASSIUM SULFATE, MAGNESIUM SULFATE 17.5; 3.13; 1.6 G/ML; G/ML; G/ML
17.5-3.13-1.6 SOLUTION, CONCENTRATE ORAL
Qty: 1 | Refills: 0 | Status: ACTIVE | COMMUNITY
Start: 2022-01-24 | End: 1900-01-01

## 2022-01-27 LAB
ALTERN TENCAPG(M6): 5.3 MCG/ML
ASPER FUMCAPG(M3): 47.1 MCG/ML
AUREOBASCAPG(M12): 5.1 MCG/ML
MICROPOLYCAPG(M22): <2 MCG/ML
PENIC CHRYCAPG(M1): 34.8 MCG/ML
PHOMA BETAE IGG: 8.5 MCG/ML
THERMOCAPG(M23): 4.4 MCG/ML
TRICHODERMA VIRIDE IGG: 2.4 MCG/ML

## 2022-02-14 ENCOUNTER — RX CHANGE (OUTPATIENT)
Age: 67
End: 2022-02-14

## 2022-02-14 RX ORDER — MONTELUKAST 10 MG/1
10 TABLET, FILM COATED ORAL
Qty: 30 | Refills: 2 | Status: DISCONTINUED | COMMUNITY
Start: 2022-01-20 | End: 2022-02-14

## 2022-02-25 ENCOUNTER — OUTPATIENT (OUTPATIENT)
Dept: OUTPATIENT SERVICES | Facility: HOSPITAL | Age: 67
LOS: 1 days | End: 2022-02-25

## 2022-02-25 VITALS
RESPIRATION RATE: 16 BRPM | SYSTOLIC BLOOD PRESSURE: 140 MMHG | WEIGHT: 171.96 LBS | DIASTOLIC BLOOD PRESSURE: 80 MMHG | TEMPERATURE: 97 F | HEIGHT: 67 IN | HEART RATE: 71 BPM | OXYGEN SATURATION: 99 %

## 2022-02-25 DIAGNOSIS — R12 HEARTBURN: ICD-10-CM

## 2022-02-25 DIAGNOSIS — Z12.11 ENCOUNTER FOR SCREENING FOR MALIGNANT NEOPLASM OF COLON: ICD-10-CM

## 2022-02-25 DIAGNOSIS — I10 ESSENTIAL (PRIMARY) HYPERTENSION: ICD-10-CM

## 2022-02-25 DIAGNOSIS — Z98.89 OTHER SPECIFIED POSTPROCEDURAL STATES: Chronic | ICD-10-CM

## 2022-02-25 RX ORDER — SODIUM CHLORIDE 9 MG/ML
1000 INJECTION, SOLUTION INTRAVENOUS
Refills: 0 | Status: DISCONTINUED | OUTPATIENT
Start: 2022-03-08 | End: 2022-03-22

## 2022-02-25 RX ORDER — BUDESONIDE, GLYCOPYRROLATE, AND FORMOTEROL FUMARATE 160; 9; 4.8 UG/1; UG/1; UG/1
2 AEROSOL, METERED RESPIRATORY (INHALATION)
Qty: 0 | Refills: 0 | DISCHARGE

## 2022-02-25 RX ORDER — AMLODIPINE BESYLATE 2.5 MG/1
1 TABLET ORAL
Qty: 0 | Refills: 0 | DISCHARGE

## 2022-02-25 RX ORDER — DICLOFENAC SODIUM 75 MG/1
1 TABLET, DELAYED RELEASE ORAL
Qty: 0 | Refills: 0 | DISCHARGE

## 2022-02-25 RX ORDER — TIOTROPIUM BROMIDE 18 UG/1
2 CAPSULE ORAL; RESPIRATORY (INHALATION)
Qty: 0 | Refills: 0 | DISCHARGE

## 2022-02-25 RX ORDER — ATORVASTATIN CALCIUM 80 MG/1
1 TABLET, FILM COATED ORAL
Qty: 0 | Refills: 0 | DISCHARGE

## 2022-02-25 RX ORDER — ASPIRIN/CALCIUM CARB/MAGNESIUM 324 MG
1 TABLET ORAL
Qty: 0 | Refills: 0 | DISCHARGE

## 2022-02-25 NOTE — H&P PST ADULT - PROBLEM SELECTOR PLAN 1
Patient tentatively scheduled for Esophagogastroduodenoscopy colonoscopy on 3/8/22.  Pre-op instructions provided. Pt given verbal and written instructions with teach back  Pt verbalized understanding with return demonstration.   Covid testing scheduled prior to surgery as per patient.  RADHA precautions

## 2022-02-25 NOTE — H&P PST ADULT - PROBLEM SELECTOR PLAN 2
Patient instructed to take amlodipine with a sip of water on the morning of procedure.   Recent Echo, Stress, Cath and Sleep Study results in chart

## 2022-02-25 NOTE — H&P PST ADULT - PRIMARY CARE PROVIDER
Dr Derrek Martínez (PCP) [phone]                                                             Dr Bev Ontiveros (cardiology) [phone]

## 2022-02-25 NOTE — H&P PST ADULT - HISTORY OF PRESENT ILLNESS
67 year old male with PMH of HTN, HLD,  presents to Presurgical testing with diagnosis of Heartburn scheduled for Esophagogastroduodenoscopy colonoscopy

## 2022-03-08 ENCOUNTER — RESULT REVIEW (OUTPATIENT)
Age: 67
End: 2022-03-08

## 2022-03-08 ENCOUNTER — OUTPATIENT (OUTPATIENT)
Dept: OUTPATIENT SERVICES | Facility: HOSPITAL | Age: 67
LOS: 1 days | Discharge: HOME | End: 2022-03-08
Payer: MEDICARE

## 2022-03-08 ENCOUNTER — NON-APPOINTMENT (OUTPATIENT)
Age: 67
End: 2022-03-08

## 2022-03-08 ENCOUNTER — APPOINTMENT (OUTPATIENT)
Dept: GASTROENTEROLOGY | Facility: HOSPITAL | Age: 67
End: 2022-03-08

## 2022-03-08 VITALS
SYSTOLIC BLOOD PRESSURE: 124 MMHG | WEIGHT: 173.94 LBS | HEIGHT: 67 IN | HEART RATE: 56 BPM | RESPIRATION RATE: 16 BRPM | DIASTOLIC BLOOD PRESSURE: 70 MMHG | TEMPERATURE: 98 F | OXYGEN SATURATION: 99 %

## 2022-03-08 VITALS
HEART RATE: 66 BPM | DIASTOLIC BLOOD PRESSURE: 87 MMHG | SYSTOLIC BLOOD PRESSURE: 134 MMHG | RESPIRATION RATE: 20 BRPM | OXYGEN SATURATION: 99 %

## 2022-03-08 DIAGNOSIS — R12 HEARTBURN: ICD-10-CM

## 2022-03-08 DIAGNOSIS — Z98.89 OTHER SPECIFIED POSTPROCEDURAL STATES: Chronic | ICD-10-CM

## 2022-03-08 PROCEDURE — 45380 COLONOSCOPY AND BIOPSY: CPT | Mod: 59

## 2022-03-08 PROCEDURE — 43239 EGD BIOPSY SINGLE/MULTIPLE: CPT

## 2022-03-08 PROCEDURE — 45385 COLONOSCOPY W/LESION REMOVAL: CPT

## 2022-03-08 PROCEDURE — 88305 TISSUE EXAM BY PATHOLOGIST: CPT | Mod: 26

## 2022-03-10 NOTE — ASU PREOP CHECKLIST - TEMPERATURE IN CELSIUS (DEGREES C)
Subjective Doing well, resting in bed. Denies abnormal pain. Tolbert catheter in place with small amounts of hematuria. PD cath in place. Lap sites c/d/i.    Objective     I/O's    Intake/Output Summary (Last 24 hours) at 3/10/2022 0848  Last data filed at 3/9/2022 2200  Gross per 24 hour   Intake 1500 ml   Output 50 ml   Net 1450 ml       Last Recorded Vitals  Blood pressure (!) 163/68, pulse 66, temperature 97.5 °F (36.4 °C), temperature source Oral, resp. rate 16, height 5' 8\" (1.727 m), weight 73.4 kg (161 lb 13.1 oz), SpO2 98 %.  Body mass index is 24.6 kg/m².    Physical Exam  Vitals reviewed.   Abdominal:      Comments: Lap sites c/d/i.  PD cath.   Genitourinary:     Comments: tolbert  Skin:     General: Skin is warm.      Capillary Refill: Capillary refill takes less than 2 seconds.   Neurological:      General: No focal deficit present.      Mental Status: He is alert.   Psychiatric:         Mood and Affect: Mood normal.         Labs       Imaging      Assessment & Plan   Patient Active Problem List   Diagnosis   • Preoperative clearance   • Essential hypertension   • Mixed hyperlipidemia   • Peritoneal dialysis catheter in situ (CMS/HCC)   • History of kidney cancer       DVT Prophylaxis      Smoking Cessation  Counseling given: Not Answered  Comment: SMOKED IN 1980'S          Dx: left renal mass.    S/p Extensive laparoscopic lysis of abdominal adhesions. Left robot assisted laparoscopic radical nephrectomy on 3/9/22 with Dr. Muhammad.    POD#1.    Advance diet as tolerated.  Encourage IS  Ambulate TID  Continue Tolbert cath  [place] PRN.     Will continue to monitor.          36.6

## 2022-03-15 LAB — SURGICAL PATHOLOGY STUDY: SIGNIFICANT CHANGE UP

## 2022-03-30 ENCOUNTER — APPOINTMENT (OUTPATIENT)
Dept: PULMONOLOGY | Facility: CLINIC | Age: 67
End: 2022-03-30

## 2022-05-10 ENCOUNTER — NON-APPOINTMENT (OUTPATIENT)
Age: 67
End: 2022-05-10

## 2022-05-10 ENCOUNTER — APPOINTMENT (OUTPATIENT)
Dept: CARDIOLOGY | Facility: CLINIC | Age: 67
End: 2022-05-10
Payer: MEDICARE

## 2022-05-10 VITALS
DIASTOLIC BLOOD PRESSURE: 82 MMHG | HEIGHT: 68 IN | BODY MASS INDEX: 26.98 KG/M2 | HEART RATE: 49 BPM | SYSTOLIC BLOOD PRESSURE: 134 MMHG | OXYGEN SATURATION: 95 % | WEIGHT: 178 LBS

## 2022-05-10 PROBLEM — I10 ESSENTIAL (PRIMARY) HYPERTENSION: Chronic | Status: ACTIVE | Noted: 2022-02-25

## 2022-05-10 PROCEDURE — 93000 ELECTROCARDIOGRAM COMPLETE: CPT

## 2022-05-10 PROCEDURE — 99215 OFFICE O/P EST HI 40 MIN: CPT

## 2022-05-25 NOTE — DISCUSSION/SUMMARY
[FreeTextEntry1] : \par Plan:\par 1. continue current medications\par 2. ongoing non-pharmacological measures - diet + exercise\par 3. ongoing follow-up Dr Allison\par 4. Old records requested and reviewed with performing physician.\par 5. Primary and secondary prevention of cardiovascular and related conditions discussed at length, including but not limited to diet and lifestyle modification.\par 6. Patient to return to the office in 6 months.

## 2022-05-25 NOTE — HISTORY OF PRESENT ILLNESS
[FreeTextEntry1] : Mr Nazario is a 66 year old man with known exertional dyspnea who was being seen by Dr. Allison but has not followed up and had a stress test in January which demonstrated perfusion defects. He continues to have class II exertional dyspnea but otherwise stable. Saw Dr Allison recently and planned for repeat PFT and screening CT chest. We discussed proceeding with invasive coronary angiogram and Mr Nazario is agreeable. \par \par Coronary angiogram was performed on Nov 16 which showed minor non-obstructive epicardial coronary disease. He felt reassured after the angiogram and has not been experience any significant cardiac symptoms. Fasting LDL done at the time of angiogram was 59 mg/dL but TG mildly elevated 178 mg/dL. Recent sleep study showed mild RADHA (AHI 4.4). We discussed management goals moving forward with focus on non-pharmacological measures including weight loss.\par \par Follow-up 5/11/22 - well, asymptomatic from CV perspective. Recent blood tests showed satisfactory cholesterol profile.

## 2022-08-10 ENCOUNTER — NON-APPOINTMENT (OUTPATIENT)
Age: 67
End: 2022-08-10

## 2022-08-14 ENCOUNTER — NON-APPOINTMENT (OUTPATIENT)
Age: 67
End: 2022-08-14

## 2023-03-08 ENCOUNTER — APPOINTMENT (OUTPATIENT)
Dept: CARDIOLOGY | Facility: CLINIC | Age: 68
End: 2023-03-08
Payer: MEDICARE

## 2023-03-08 ENCOUNTER — NON-APPOINTMENT (OUTPATIENT)
Age: 68
End: 2023-03-08

## 2023-03-08 VITALS — DIASTOLIC BLOOD PRESSURE: 80 MMHG | HEART RATE: 53 BPM | SYSTOLIC BLOOD PRESSURE: 127 MMHG

## 2023-03-08 DIAGNOSIS — R06.02 SHORTNESS OF BREATH: ICD-10-CM

## 2023-03-08 DIAGNOSIS — R94.39 ABNORMAL RESULT OF OTHER CARDIOVASCULAR FUNCTION STUDY: ICD-10-CM

## 2023-03-08 PROCEDURE — 99214 OFFICE O/P EST MOD 30 MIN: CPT

## 2023-03-08 PROCEDURE — 93000 ELECTROCARDIOGRAM COMPLETE: CPT

## 2023-03-31 PROBLEM — R94.39 ABNORMAL STRESS TEST: Status: ACTIVE | Noted: 2021-08-31

## 2023-03-31 PROBLEM — R06.02 SHORTNESS OF BREATH: Status: ACTIVE | Noted: 2018-10-02

## 2023-03-31 NOTE — HISTORY OF PRESENT ILLNESS
[FreeTextEntry1] : Mr Nazario is a 68 year old man with known exertional dyspnea who was being seen by Dr. Allison but has not followed up and had a stress test in January which demonstrated perfusion defects. He continues to have class II exertional dyspnea but otherwise stable. Saw Dr Allison recently and planned for repeat PFT and screening CT chest. We discussed proceeding with invasive coronary angiogram and Mr Nazario is agreeable. \par \par Coronary angiogram was performed on Nov 16 which showed minor non-obstructive epicardial coronary disease. He felt reassured after the angiogram and has not been experience any significant cardiac symptoms. Fasting LDL done at the time of angiogram was 59 mg/dL but TG mildly elevated 178 mg/dL. Recent sleep study showed mild RADHA (AHI 4.4). We discussed management goals moving forward with focus on non-pharmacological measures including weight loss.\par \par Follow-up 5/11/22 - well, asymptomatic from CV perspective. Recent blood tests showed satisfactory cholesterol profile.

## 2023-03-31 NOTE — DISCUSSION/SUMMARY
[FreeTextEntry1] : \par Plan:\par 1. continue current medications\par 2. ongoing non-pharmacological measures - diet + exercise\par 3. ongoing follow-up Dr Allison\par 4. Old records requested and reviewed with performing physician.\par 5. Primary and secondary prevention of cardiovascular and related conditions discussed at length, including but not limited to diet and lifestyle modification.\par 6. Patient to return to the office in 6 months.  [EKG obtained to assist in diagnosis and management of assessed problem(s)] : EKG obtained to assist in diagnosis and management of assessed problem(s)

## 2023-10-09 NOTE — H&P PST ADULT - BP NONINVASIVE MEAN (MM HG)
#Discharge: do not delete    Patient is a 84M, (shellfish rxn w/ Lip Swelling ~ 20 yrs ago),  w/ PMHx CAD s/p JOSÉ to mLAD (6/2023), HTN, HLD, cervical spondylosis s/p laminectomy, gastric ulcers, hypothyroidism,  who presents to [facility] ED 10/6/23 for BELLO with minimal exertion, chest pressure, and orthopnea x 3 days      Problem List/Main Diagnoses:     New medications/therapies:   New lines/hardware:  Labs to be followed outpatient:   Exam to be followed outpatient:     Discharge plan: discharge to ______    Physical Exam Upon Discharge:     #Discharge: do not delete    Patient is a 84M, (shellfish rxn w/ Lip Swelling ~ 20 yrs ago),  w/ PMHx CAD s/p JOSÉ to mLAD (6/2023), HTN, HLD, cervical spondylosis s/p laminectomy, gastric ulcers, hypothyroidism,  who presents to [facility] ED 10/6/23 for BELLO with minimal exertion, chest pressure, and orthopnea x 3 days      Problem List/Main Diagnoses:     New medications/therapies:   New lines/hardware:  Labs to be followed outpatient:   Exam to be followed outpatient:     Discharge plan: discharge to ______    Physical Exam Upon Discharge:     #Discharge: do not delete    Patient is a 84M, (shellfish rxn w/ Lip Swelling ~ 20 yrs ago),  w/ PMHx CAD s/p JOSÉ to mLAD (6/2023), HTN, HLD, cervical spondylosis s/p laminectomy, gastric ulcers, hypothyroidism,  who presents to [facility] ED 10/6/23 for BELLO with minimal exertion, chest pressure, and orthopnea x 3 days. EKG non ischemic, Troponin T negative. Echo revealed mildly dilated right ventricular size with mildly reduced right ventricular systolic function.      Problem List/Main Diagnoses:     New medications/therapies:   New lines/hardware:  Labs to be followed outpatient:   Exam to be followed outpatient:     Discharge plan: discharge to ______    Physical Exam Upon Discharge:     #Discharge: do not delete    Patient is a 84M, (shellfish rxn w/ Lip Swelling ~ 20 yrs ago),  w/ PMHx CAD s/p JOSÉ to mLAD (6/2023), HTN, HLD, cervical spondylosis s/p laminectomy, gastric ulcers, hypothyroidism,  who presents to [facility] ED 10/6/23 for BELLO with minimal exertion, chest pressure, and orthopnea x 3 days. EKG non ischemic, Troponin T negative. Echo revealed mildly dilated right ventricular size with mildly reduced right ventricular systolic function.      Problem List/Main Diagnoses:     New medications/therapies:   New lines/hardware:  Labs to be followed outpatient:   Exam to be followed outpatient:     Discharge plan: discharge to ______    Physical Exam Upon Discharge:     #Discharge: do not delete    Patient is a 84M, (shellfish rxn w/ Lip Swelling ~ 20 yrs ago),  w/ PMHx CAD s/p JOSÉ to mLAD (6/2023), HTN, HLD, cervical spondylosis s/p laminectomy, gastric ulcers, hypothyroidism,  who presents to [facility] ED 10/6/23 for BELLO with minimal exertion, chest pressure, and orthopnea x 3 days. EKG non ischemic, Troponin T negative. Echo revealed mildly dilated right ventricular size with mildly reduced right ventricular systolic function.      Problem List/Main Diagnoses:     New medications/therapies:   New lines/hardware:  Labs to be followed outpatient:   Exam to be followed outpatient:     Discharge plan: discharge to ______    Physical Exam Upon Discharge:     100 #Discharge: do not delete    Patient is a 84M, (shellfish rxn w/ Lip Swelling ~ 20 yrs ago),  w/ PMHx CAD s/p JOSÉ to mLAD (6/2023), HTN, HLD, cervical spondylosis s/p laminectomy, gastric ulcers, hypothyroidism,  who presents to [facility] ED 10/6/23 for BELLO with minimal exertion, chest pressure, and orthopnea x 3 days. EKG non ischemic, Troponin T negative. Echo revealed mildly dilated right ventricular size with mildly reduced right ventricular systolic function. RUQUS revealed cholelithiasis, CTAP with acute cholecystitis with fat stranding, HIDA had non visualization of the gallbladder indicating cholecystis. General surgery consulted and recommended no surgical intervention, percutaneous cholecystostomy and abx. IR consulted, and pt at bleed risk secondary to clopidogrel. Pt received ctx 2g and flagyl. Leukocytes downtrending, pt has been clinically stable with mild abdominal pain. Pt will continue abx until       Problem List/Main Diagnoses:     New medications/therapies:   New lines/hardware:  Labs to be followed outpatient:   Exam to be followed outpatient:     Discharge plan: discharge to ______    Physical Exam Upon Discharge:     #Discharge: do not delete    Patient is a 84M, (shellfish rxn w/ Lip Swelling ~ 20 yrs ago),  w/ PMHx CAD s/p JOSÉ to mLAD (6/2023), HTN, HLD, cervical spondylosis s/p laminectomy, gastric ulcers, hypothyroidism,  who presents to [facility] ED 10/6/23 for BELLO with minimal exertion, chest pressure, and orthopnea x 3 days. EKG non ischemic, Troponin T negative. Echo revealed mildly dilated right ventricular size with mildly reduced right ventricular systolic function. RUQUS revealed cholelithiasis, CTAP with acute cholecystitis with fat stranding, HIDA had non visualization of the gallbladder indicating cholecystis. General surgery consulted and recommended no surgical intervention, percutaneous cholecystostomy and abx. IR consulted, and pt at bleed risk secondary to clopidogrel. Pt received ctx 2g and flagyl. Leukocytes downtrending, pt has been clinically stable with mild abdominal pain. Pt will continue abx until       Problem List/Main Diagnoses:     New medications/therapies:   New lines/hardware:  Labs to be followed outpatient:   Exam to be followed outpatient:     Discharge plan: discharge to ______    Physical Exam Upon Discharge:     #Discharge: do not delete    Patient is a 84M, (shellfish rxn w/ Lip Swelling ~ 20 yrs ago),  w/ PMHx CAD s/p JOSÉ to mLAD (6/2023), HTN, HLD, cervical spondylosis s/p laminectomy, gastric ulcers, hypothyroidism,  who presents to [facility] ED 10/6/23 for BELLO with minimal exertion, chest pressure, and orthopnea x 3 days. EKG non ischemic, Troponin T negative. Echo revealed mildly dilated right ventricular size with mildly reduced right ventricular systolic function. RUQUS revealed cholelithiasis, CTAP with acute cholecystitis with fat stranding, HIDA had non visualization of the gallbladder indicating cholecystis. General surgery consulted and recommended no surgical intervention, percutaneous cholecystostomy and abx. IR consulted, and pt at bleed risk secondary to clopidogrel. Pt received ctx 2g and flagyl. Leukocytes downtrending, pt has been clinically stable with mild abdominal pain. Pt will continue abx until       Problem List/Main Diagnoses:     New medications/therapies:   New lines/hardware:  Labs to be followed outpatient:   Exam to be followed outpatient:     Discharge plan: discharge to ______    Physical Exam Upon Discharge:     #Discharge: do not delete    Patient is a 84M, (shellfish rxn w/ Lip Swelling ~ 20 yrs ago),  w/ PMHx CAD s/p JOSÉ to mLAD (6/2023), HTN, HLD, cervical spondylosis s/p laminectomy, gastric ulcers, hypothyroidism,  who presents to [facility] ED 10/6/23 for BELLO with minimal exertion, chest pressure, and orthopnea x 3 days. EKG non ischemic, Troponin T negative. Echo revealed mildly dilated right ventricular size with mildly reduced right ventricular systolic function. RUQUS revealed cholelithiasis, CTAP with acute cholecystitis with fat stranding, HIDA had non visualization of the gallbladder indicating cholecystis. General surgery consulted and recommended no surgical intervention, percutaneous cholecystostomy and abx. IR consulted, and pt at bleed risk secondary to clopidogrel. Pt received ctx 2g and flagyl. Leukocytes downtrending, pt has been clinically stable with mild abdominal pain. Pt will continue abx until 10/22. Pt will follow up with Dr. Gomez outpatient.      Problem List/Main Diagnoses:   #Dyspnea.   ·  Plan: Presents w/ BELLO w/ minimal exertion, chest pain, orthopnea (2 pillow use)  x 3 days. proBNP 1279. Prior hx CAD: JOSÉ mLAD in 6/2023. Compliant w/ DAPT aspirin/Plavix. Received Lasix 20mg IV x 1 in ED. EKG nonischemic. Troponin T x 1 negative. s/p lasix 50mg IVP. Cath denied. Pt presented with acs sxs initially and admitted to cardiology service to r/o cardiac causes. Pt initially required oxygen, and was thought to be secondary to acute chf excerbation. Pt found to have cholecystits on CTAP and sxs are likely due to cholecystits. Pt is not complaining of shortness of breath or chest pain. Currently on 2L. Likely from atelectasis. Pt used incentive spirometer during admission. Pt will require oxygen at home. Pt stable for discharge and will follow up outpatient with pcp    - f/u outpatient with pcp    #Abdominal pain.   ·  Plan: Reports sharp pinpoint RUQ discomfort worsened with palpation x 1-2 days. Last BM 10/3 w/ suppository at home. Daughter reports decreased eating and fluid intake at home lately.  RUQUS: cholelithiasis  CTAP: acute cholecystitis with fat stranding  HIDA: non visualization of the gallbladder, likely representing acute cholecystitis  Pt currently day 2 of holding plavix 10/10    - surgery consulted, f/u recs  - IR consulted, f/u recs  - c/w ctx to 2gm (end date10/22)  - c/w flagyl 500mg q8 (end date 10/23).    #Leukocytosis.   ·  Plan: WBC 16.26 elevated on admission. Reported fever at home yesterday improved w/ Tylenol. Likely in the setting of cholecystitis. procal 0.25 elevated s/p abx IV Ceftriaxone x 5 days and Azithromycin x 1 in ED. RUQUS, CTAP and HIDA revealed cholecystitis. Pt continued on ceftriaxone and flagyl. Pt will be discharged with  cefpodoxime and flagyl to complete full course. Pt will follow up with Dr. Gomez outpatient.    #Diabetes mellitus.   ·  Plan: found to have elevated glucose on BMP  A1c found to be 7.7. no hx of diabetes    - monitor finger sticks and switch diet to carb consistent if unable to control  - endocrinology consulted, f/u recs  - per endo, c/w moderate dose sliding scale.    #Hyponatremia.   ·  Plan: Na 126 on admission. On lasix 40mg IV qD. Likely hypovolemic hyponatremia iso of decreased po intake vs diuretic(presented with na 126) vs siadh iso of pain. Feurea 50.2%    - nephrology consulted, f/u recs  - gentle hydration  - continue to monitor.    #HTN (hypertension).   ·  Plan: - Continue home metoprolol 12.5mg qd.    #HLD (hyperlipidemia).   ·  Plan: - continue Lipitor 40mg qd (alternate for home Crestor 20mg qd).    #Hypothyroid.   ·  Plan: continue home Synthroid 50mcg qd. TSH at 10.160. Home med synthroid 50mcg. Repeat tsh at 6.990    - c/w home med.    #History of BPH.   ·  Plan: per Daughter, pt's home Flomax d/c recently as was not beneficial. Pt has been voiding on his own.  -monitor for any urinary retention.    #CANDELARIA (iron deficiency anemia).   ·  Plan; Total iron at 28, TIBC at 208, Iron saturation at 13, Ferritin at 622, transferrin at 161.    - consider iron supplementation when cholecystitis resolves.    New medications/therapies: cefpodoxime 200mg BID until 10/22, flagyl until 10/22  New lines/hardware: none  Labs to be followed outpatient: none  Exam to be followed outpatient: none    Discharge plan: discharge to home    Physical Exam Upon Discharge:  General: in no acute distress  Eyes: EOMI intact bilaterally  HENT: Moist mucous membranes  Neck: Trachea midline  Lungs: CTA B/L. No wheezes, rales, or rhonchi  Cardiovascular: RRR. No murmurs, rubs, or gallops  Abdomen: Soft, non-tender non-distended  Extremities: WWP, No clubbing, cyanosis or edema  Neurological: Alert and oriented  Skin: Warm and dry     #Discharge: do not delete    Patient is a 84M, (shellfish rxn w/ Lip Swelling ~ 20 yrs ago),  w/ PMHx CAD s/p JOSÉ to mLAD (6/2023), HTN, HLD, cervical spondylosis s/p laminectomy, gastric ulcers, hypothyroidism,  who presents to [facility] ED 10/6/23 for BELLO with minimal exertion, chest pressure, and orthopnea x 3 days. EKG non ischemic, Troponin T negative. Echo revealed mildly dilated right ventricular size with mildly reduced right ventricular systolic function. RUQUS revealed cholelithiasis, CTAP with acute cholecystitis with fat stranding, HIDA had non visualization of the gallbladder indicating cholecystis. General surgery consulted and recommended no surgical intervention, percutaneous cholecystostomy and abx. IR consulted, and pt at bleed risk secondary to clopidogrel. Pt received ctx 2g and flagyl. Leukocytes downtrending, pt has been clinically stable with mild abdominal pain. Pt will continue abx until 10/22. Pt will follow up with Dr. Gomez outpatient.      Problem List/Main Diagnoses:   #Dyspnea.   ·  Plan: Presents w/ BELLO w/ minimal exertion, chest pain, orthopnea (2 pillow use)  x 3 days. proBNP 1279. Prior hx CAD: JOSÉ mLAD in 6/2023. Compliant w/ DAPT aspirin/Plavix. Received Lasix 20mg IV x 1 in ED. EKG nonischemic. Troponin T x 1 negative. s/p lasix 50mg IVP. Cath denied. Pt presented with acs sxs initially and admitted to cardiology service to r/o cardiac causes. Pt initially required oxygen, and was thought to be secondary to acute chf excerbation. Pt found to have cholecystits on CTAP and sxs are likely due to cholecystits. Pt is not complaining of shortness of breath or chest pain. Currently on 2L. Likely from atelectasis. Pt used incentive spirometer during admission. Pt will require oxygen at home. Pt stable for discharge and will follow up outpatient with pcp    - f/u outpatient with pcp    #Abdominal pain.   ·  Plan: Reports sharp pinpoint RUQ discomfort worsened with palpation x 1-2 days. Last BM 10/3 w/ suppository at home. Daughter reports decreased eating and fluid intake at home lately.  RUQUS: cholelithiasis  CTAP: acute cholecystitis with fat stranding  HIDA: non visualization of the gallbladder, likely representing acute cholecystitis  Pt currently day 2 of holding plavix 10/10    - surgery consulted, f/u recs  - IR consulted, f/u recs  - c/w ctx to 2gm (end date10/22)  - c/w flagyl 500mg q8 (end date 10/23).    #Leukocytosis.   ·  Plan: WBC 16.26 elevated on admission. Reported fever at home yesterday improved w/ Tylenol. Likely in the setting of cholecystitis. procal 0.25 elevated s/p abx IV Ceftriaxone x 5 days and Azithromycin x 1 in ED. RUQUS, CTAP and HIDA revealed cholecystitis. Pt continued on ceftriaxone and flagyl. Pt will be discharged with  cefpodoxime and flagyl to complete full course. Pt will follow up with Dr. Gomez outpatient.    #Diabetes mellitus.   ·  Plan: found to have elevated glucose on BMP  A1c found to be 7.7. no hx of diabetes    - monitor finger sticks and switch diet to carb consistent if unable to control  - endocrinology consulted, f/u recs  - per endo, c/w moderate dose sliding scale.    #Hyponatremia.   ·  Plan: Na 126 on admission. On lasix 40mg IV qD. Likely hypovolemic hyponatremia iso of decreased po intake vs diuretic(presented with na 126) vs siadh iso of pain. Feurea 50.2%    - nephrology consulted, f/u recs  - gentle hydration  - continue to monitor.    #HTN (hypertension).   ·  Plan: - Continue home metoprolol 12.5mg qd.    #HLD (hyperlipidemia).   ·  Plan: - continue Lipitor 40mg qd (alternate for home Crestor 20mg qd).    #Hypothyroid.   ·  Plan: continue home Synthroid 50mcg qd. TSH at 10.160. Home med synthroid 50mcg. Repeat tsh at 6.990    - c/w home med.    #History of BPH.   ·  Plan: per Daughter, pt's home Flomax d/c recently as was not beneficial. Pt has been voiding on his own.  -monitor for any urinary retention.    #CANDELARIA (iron deficiency anemia).   ·  Plan; Total iron at 28, TIBC at 208, Iron saturation at 13, Ferritin at 622, transferrin at 161.    - consider iron supplementation when cholecystitis resolves.    New medications/therapies: cefpodoxime 200mg BID until 10/22, flagyl until 10/22  New lines/hardware: none  Labs to be followed outpatient: none  Exam to be followed outpatient: none    Discharge plan: discharge to home    Physical Exam Upon Discharge:  General: in no acute distress  Eyes: EOMI intact bilaterally  HENT: Moist mucous membranes  Neck: Trachea midline  Lungs: CTA B/L. No wheezes, rales, or rhonchi  Cardiovascular: RRR. No murmurs, rubs, or gallops  Abdomen: Soft, non-tender non-distended  Extremities: WWP, No clubbing, cyanosis or edema  Neurological: Alert and oriented  Skin: Warm and dry     #Discharge: do not delete    Patient is a 84M, (shellfish rxn w/ Lip Swelling ~ 20 yrs ago),  w/ PMHx CAD s/p JOSÉ to mLAD (6/2023), HTN, HLD, cervical spondylosis s/p laminectomy, gastric ulcers, hypothyroidism,  who presents to [facility] ED 10/6/23 for BELLO with minimal exertion, chest pressure, and orthopnea x 3 days. EKG non ischemic, Troponin T negative. Echo revealed mildly dilated right ventricular size with mildly reduced right ventricular systolic function. RUQUS revealed cholelithiasis, CTAP with acute cholecystitis with fat stranding, HIDA had non visualization of the gallbladder indicating cholecystis. General surgery consulted and recommended no surgical intervention, percutaneous cholecystostomy and abx. IR consulted, and pt at bleed risk secondary to clopidogrel. Pt received ctx 2g and flagyl. Leukocytes downtrending, pt has been clinically stable with mild abdominal pain. Pt will continue abx until 10/22. Pt will follow up with Dr. Gomez outpatient.      Problem List/Main Diagnoses:   #Dyspnea.   ·  Plan: Presents w/ BELLO w/ minimal exertion, chest pain, orthopnea (2 pillow use)  x 3 days. proBNP 1279. Prior hx CAD: JOSÉ mLAD in 6/2023. Compliant w/ DAPT aspirin/Plavix. Received Lasix 20mg IV x 1 in ED. EKG nonischemic. Troponin T x 1 negative. s/p lasix 50mg IVP. Cath denied. Pt presented with acs sxs initially and admitted to cardiology service to r/o cardiac causes. Pt initially required oxygen, and was thought to be secondary to acute chf excerbation. Pt found to have cholecystits on CTAP and sxs are likely due to cholecystits. Pt is not complaining of shortness of breath or chest pain. Currently on 2L. Likely from atelectasis. Pt used incentive spirometer during admission. Pt will require oxygen at home. Pt stable for discharge and will follow up outpatient with pcp    - f/u outpatient with pcp    #Abdominal pain.   ·  Plan: Reports sharp pinpoint RUQ discomfort worsened with palpation x 1-2 days. Last BM 10/3 w/ suppository at home. Daughter reports decreased eating and fluid intake at home lately.  RUQUS: cholelithiasis  CTAP: acute cholecystitis with fat stranding  HIDA: non visualization of the gallbladder, likely representing acute cholecystitis  Pt currently day 2 of holding plavix 10/10    - surgery consulted, f/u recs  - IR consulted, f/u recs  - c/w ctx to 2gm (end date10/22)  - c/w flagyl 500mg q8 (end date 10/23).    #Leukocytosis.   ·  Plan: WBC 16.26 elevated on admission. Reported fever at home yesterday improved w/ Tylenol. Likely in the setting of cholecystitis. procal 0.25 elevated s/p abx IV Ceftriaxone x 5 days and Azithromycin x 1 in ED. RUQUS, CTAP and HIDA revealed cholecystitis. Pt continued on ceftriaxone and flagyl. Pt will be discharged with  cefpodoxime and flagyl to complete full course. Pt will follow up with Dr. Gomez outpatient.    #Diabetes mellitus.   ·  Plan: found to have elevated glucose on BMP  A1c found to be 7.7. no hx of diabetes    - monitor finger sticks and switch diet to carb consistent if unable to control  - endocrinology consulted, f/u recs  - per endo, c/w moderate dose sliding scale.    #Hyponatremia.   ·  Plan: Na 126 on admission. On lasix 40mg IV qD. Likely hypovolemic hyponatremia iso of decreased po intake vs diuretic(presented with na 126) vs siadh iso of pain. Feurea 50.2%    - nephrology consulted, f/u recs  - gentle hydration  - continue to monitor.    #HTN (hypertension).   ·  Plan: - Continue home metoprolol 12.5mg qd.    #HLD (hyperlipidemia).   ·  Plan: - continue Lipitor 40mg qd (alternate for home Crestor 20mg qd).    #Hypothyroid.   ·  Plan: continue home Synthroid 50mcg qd. TSH at 10.160. Home med synthroid 50mcg. Repeat tsh at 6.990    - c/w home med.    #History of BPH.   ·  Plan: per Daughter, pt's home Flomax d/c recently as was not beneficial. Pt has been voiding on his own.  -monitor for any urinary retention.    #CANDELARIA (iron deficiency anemia).   ·  Plan; Total iron at 28, TIBC at 208, Iron saturation at 13, Ferritin at 622, transferrin at 161.    - consider iron supplementation when cholecystitis resolves.    New medications/therapies: cefpodoxime 200mg BID until 10/22, flagyl until 10/22  New lines/hardware: none  Labs to be followed outpatient: none  Exam to be followed outpatient: none    Discharge plan: discharge to home    Physical Exam Upon Discharge:  General: in no acute distress  Eyes: EOMI intact bilaterally  HENT: Moist mucous membranes  Neck: Trachea midline  Lungs: CTA B/L. No wheezes, rales, or rhonchi  Cardiovascular: RRR. No murmurs, rubs, or gallops  Abdomen: Soft, non-tender non-distended  Extremities: WWP, No clubbing, cyanosis or edema  Neurological: Alert and oriented  Skin: Warm and dry     #Discharge: do not delete    Patient is a 84M, (shellfish rxn w/ Lip Swelling ~ 20 yrs ago),  w/ PMHx CAD s/p JOSÉ to mLAD (6/2023), HTN, HLD, cervical spondylosis s/p laminectomy, gastric ulcers, hypothyroidism,  who presents to [facility] ED 10/6/23 for BELLO with minimal exertion, chest pressure, and orthopnea x 3 days. EKG non ischemic, Troponin T negative. Echo revealed mildly dilated right ventricular size with mildly reduced right ventricular systolic function. RUQUS revealed cholelithiasis, CTAP with acute cholecystitis with fat stranding, HIDA had non visualization of the gallbladder indicating cholecystis. General surgery consulted and recommended no surgical intervention, percutaneous cholecystostomy and abx. IR consulted, and pt at bleed risk secondary to clopidogrel. Pt received ctx 2g and flagyl. Leukocytes downtrending, pt has been clinically stable with mild abdominal pain. Pt will continue abx until 10/22. Pt will follow up with Dr. Gomez outpatient.      Problem List/Main Diagnoses:   #Dyspnea.   ·  Plan: Presents w/ BELLO w/ minimal exertion, chest pain, orthopnea (2 pillow use)  x 3 days. proBNP 1279. Prior hx CAD: JOSÉ mLAD in 6/2023. Compliant w/ DAPT aspirin/Plavix. Received Lasix 20mg IV x 1 in ED. EKG nonischemic. Troponin T x 1 negative. s/p lasix 50mg IVP. Cath denied. Pt presented with acs sxs initially and admitted to cardiology service to r/o cardiac causes. Pt initially required oxygen, and was thought to be secondary to acute chf excerbation. Pt found to have cholecystits on CTAP and sxs are likely due to cholecystits. Pt is not complaining of shortness of breath or chest pain. Currently on 2L. Likely from atelectasis. Pt used incentive spirometer during admission. Pt will require oxygen at home. Pt stable for discharge and will follow up outpatient with pcp.    - f/u outpatient with pcp    #Abdominal pain.   ·  Plan: Reports sharp pinpoint RUQ discomfort worsened with palpation x 1-2 days. Last BM 10/3 w/ suppository at home. Daughter reports decreased eating and fluid intake at home lately. RUQUS reveled cholelithiasis, CTAP revealed acute cholecystitis with fat stranding, HIDA had non visualization of the gallbladder. Pt received ceftriaxone and flagyl during admission. Pt will need to continue cefpodoxime and flagyl at home. Pt will follow up with Dr. Gomez.    #Leukocytosis.   ·  Plan: WBC 16.26 elevated on admission. Reported fever at home yesterday improved w/ Tylenol. Likely in the setting of cholecystitis. procal 0.25 elevated s/p abx IV Ceftriaxone x 5 days and Azithromycin x 1 in ED. RUQUS, CTAP and HIDA revealed cholecystitis. Pt continued on ceftriaxone and flagyl. Pt will be discharged with  cefpodoxime and flagyl to complete full course. Pt will follow up with Dr. Gomez outpatient.    #Diabetes mellitus.   ·  Plan: found to have elevated glucose on BMP  A1c found to be 7.7. no hx of diabetes. Endocrinology consulted, pt received moderate dose sliding scale and trajenta 5mg qAM. Pt will be discharged with trajenta 5mg. Pt will follow up with pcp outpatient.      #Hyponatremia.   ·  Plan: Na 126 on admission. On lasix 40mg IV qD. Likely hypovolemic hyponatremia iso of decreased po intake vs diuretic(presented with na 126) vs siadh iso of pain. Feurea 50.2%. Nephrology consulted. Pt was given bolus of ns multiple times. Hyponatremia corrected to XXX. Pt will follow up with pcp outpatient.    - nephrology consulted, f/u recs  - gentle hydration  - continue to monitor.    #HTN (hypertension).   ·  Plan: Receoved home metoprolol 12.5mg qd.    #HLD (hyperlipidemia).   ·  Plan: Received lipitor 40mg qd (alternate for home Crestor 20mg qd).    #Hypothyroid.   ·  Plan: Received home Synthroid 50mcg qd. TSH at 10.160. Home med synthroid 50mcg. Repeat tsh at 6.990    #History of BPH.   ·  Plan: per Daughter, pt's home Flomax d/c recently as was not beneficial. Pt has been voiding on his own.    #CANDELARIA (iron deficiency anemia).   ·  Plan; Total iron at 28, TIBC at 208, Iron saturation at 13, Ferritin at 622, transferrin at 161. Pt will follow up with pcp outpatient.    New medications/therapies: cefpodoxime 200mg BID until 10/22, flagyl until 10/22  New lines/hardware: none  Labs to be followed outpatient: none  Exam to be followed outpatient: none    Discharge plan: discharge to home    Physical Exam Upon Discharge:  General: in no acute distress  Eyes: EOMI intact bilaterally  HENT: Moist mucous membranes  Neck: Trachea midline  Lungs: CTA B/L. No wheezes, rales, or rhonchi  Cardiovascular: RRR. No murmurs, rubs, or gallops  Abdomen: Soft, non-tender non-distended  Extremities: WWP, No clubbing, cyanosis or edema  Neurological: Alert and oriented  Skin: Warm and dry     #Discharge: do not delete    Patient is a 84M, (shellfish rxn w/ Lip Swelling ~ 20 yrs ago),  w/ PMHx CAD s/p JOSÉ to mLAD (6/2023), HTN, HLD, cervical spondylosis s/p laminectomy, gastric ulcers, hypothyroidism,  who presents to [facility] ED 10/6/23 for BELLO with minimal exertion, chest pressure, and orthopnea x 3 days. EKG non ischemic, Troponin T negative. Echo revealed mildly dilated right ventricular size with mildly reduced right ventricular systolic function. RUQUS revealed cholelithiasis, CTAP with acute cholecystitis with fat stranding, HIDA had non visualization of the gallbladder indicating cholecystis. General surgery consulted and recommended no surgical intervention, percutaneous cholecystostomy and abx. IR consulted, and pt at bleed risk secondary to clopidogrel. Pt received ctx 2g and flagyl. Leukocytes downtrending, pt has been clinically stable with mild abdominal pain. Pt will continue abx until 10/22. Pt will follow up with Dr. Gomez outpatient.      Problem List/Main Diagnoses:   #Dyspnea.   ·  Plan: Presents w/ BELLO w/ minimal exertion, chest pain, orthopnea (2 pillow use)  x 3 days. proBNP 1279. Prior hx CAD: JOSÉ mLAD in 6/2023. Compliant w/ DAPT aspirin/Plavix. Received Lasix 20mg IV x 1 in ED. EKG nonischemic. Troponin T x 1 negative. s/p lasix 50mg IVP. Cath denied. Pt presented with acs sxs initially and admitted to cardiology service to r/o cardiac causes. Pt initially required oxygen, and was thought to be secondary to acute chf excerbation. Pt found to have cholecystits on CTAP and sxs are likely due to cholecystits. Pt is not complaining of shortness of breath or chest pain. Currently on 2L. Likely from atelectasis. Pt used incentive spirometer during admission. Pt will require oxygen at home. Pt stable for discharge and will follow up outpatient with pcp.    - f/u outpatient with pcp    #Abdominal pain.   ·  Plan: Reports sharp pinpoint RUQ discomfort worsened with palpation x 1-2 days. Last BM 10/3 w/ suppository at home. Daughter reports decreased eating and fluid intake at home lately. RUQUS reveled cholelithiasis, CTAP revealed acute cholecystitis with fat stranding, HIDA had non visualization of the gallbladder. Pt received ceftriaxone and flagyl during admission. Pt will need to continue cefpodoxime and flagyl at home. Pt will follow up with Dr. Gomez.    #Leukocytosis.   ·  Plan: WBC 16.26 elevated on admission. Reported fever at home yesterday improved w/ Tylenol. Likely in the setting of cholecystitis. procal 0.25 elevated s/p abx IV Ceftriaxone x 5 days and Azithromycin x 1 in ED. RUQUS, CTAP and HIDA revealed cholecystitis. Pt continued on ceftriaxone and flagyl. Pt will be discharged with  cefpodoxime and flagyl to complete full course. Pt will follow up with Dr. Gomez outpatient.    #Diabetes mellitus.   ·  Plan: found to have elevated glucose on BMP  A1c found to be 7.7. no hx of diabetes. Endocrinology consulted, pt received moderate dose sliding scale and trajenta 5mg qAM. Pt will be discharged with trajenta 5mg. Pt will follow up with pcp outpatient.      #Hyponatremia.   ·  Plan: Na 126 on admission. On lasix 40mg IV qD. Likely hypovolemic hyponatremia iso of decreased po intake vs diuretic(presented with na 126) vs siadh iso of pain. Feurea 50.2%. Nephrology consulted. Pt was given bolus of ns multiple times. Hyponatremia corrected to XXX. Pt will follow up with pcp outpatient.    - nephrology consulted, f/u recs  - gentle hydration  - continue to monitor.    #HTN (hypertension).   ·  Plan: Receoved home metoprolol 12.5mg qd.    #HLD (hyperlipidemia).   ·  Plan: Received lipitor 40mg qd (alternate for home Crestor 20mg qd).    #Hypothyroid.   ·  Plan: Received home Synthroid 50mcg qd. TSH at 10.160. Home med synthroid 50mcg. Repeat tsh at 6.990    #History of BPH.   ·  Plan: per Daughter, pt's home Flomax d/c recently as was not beneficial. Pt has been voiding on his own.    #CANDELARIA (iron deficiency anemia).   ·  Plan; Total iron at 28, TIBC at 208, Iron saturation at 13, Ferritin at 622, transferrin at 161. Pt will follow up with pcp outpatient.    New medications/therapies: cefpodoxime 200mg BID until 10/22, flagyl until 10/22  New lines/hardware: none  Labs to be followed outpatient: none  Exam to be followed outpatient: none    Discharge plan: discharge to home    Physical Exam Upon Discharge:  General: in no acute distress  Eyes: EOMI intact bilaterally  HENT: Moist mucous membranes  Neck: Trachea midline  Lungs: CTA B/L. No wheezes, rales, or rhonchi  Cardiovascular: RRR. No murmurs, rubs, or gallops  Abdomen: Soft, non-tender non-distended  Extremities: WWP, No clubbing, cyanosis or edema  Neurological: Alert and oriented  Skin: Warm and dry     #Discharge: do not delete    Patient is a 84M, (shellfish rxn w/ Lip Swelling ~ 20 yrs ago),  w/ PMHx CAD s/p JOSÉ to mLAD (6/2023), HTN, HLD, cervical spondylosis s/p laminectomy, gastric ulcers, hypothyroidism,  who presents to [facility] ED 10/6/23 for BELLO with minimal exertion, chest pressure, and orthopnea x 3 days. EKG non ischemic, Troponin T negative. Echo revealed mildly dilated right ventricular size with mildly reduced right ventricular systolic function. RUQUS revealed cholelithiasis, CTAP with acute cholecystitis with fat stranding, HIDA had non visualization of the gallbladder indicating cholecystis. General surgery consulted and recommended no surgical intervention, percutaneous cholecystostomy and abx. IR consulted, and pt at bleed risk secondary to clopidogrel. Pt received ctx 2g and flagyl. Leukocytes downtrending, pt has been clinically stable with mild abdominal pain. Pt will continue abx until 10/22. Pt will follow up with Dr. Gomez outpatient.      Problem List/Main Diagnoses:   #Dyspnea.   ·  Plan: Presents w/ BELLO w/ minimal exertion, chest pain, orthopnea (2 pillow use)  x 3 days. proBNP 1279. Prior hx CAD: JOSÉ mLAD in 6/2023. Compliant w/ DAPT aspirin/Plavix. Received Lasix 20mg IV x 1 in ED. EKG nonischemic. Troponin T x 1 negative. s/p lasix 50mg IVP. Cath denied. Pt presented with acs sxs initially and admitted to cardiology service to r/o cardiac causes. Pt initially required oxygen, and was thought to be secondary to acute chf excerbation. Pt found to have cholecystits on CTAP and sxs are likely due to cholecystits. Pt is not complaining of shortness of breath or chest pain. Currently on 2L. Likely from atelectasis. Pt used incentive spirometer during admission. Pt will require oxygen at home. Pt stable for discharge and will follow up outpatient with pcp.    - f/u outpatient with pcp    #Abdominal pain.   ·  Plan: Reports sharp pinpoint RUQ discomfort worsened with palpation x 1-2 days. Last BM 10/3 w/ suppository at home. Daughter reports decreased eating and fluid intake at home lately. RUQUS reveled cholelithiasis, CTAP revealed acute cholecystitis with fat stranding, HIDA had non visualization of the gallbladder. Pt received ceftriaxone and flagyl during admission. Pt will need to continue cefpodoxime and flagyl at home. Pt will follow up with Dr. Gomez.    #Leukocytosis.   ·  Plan: WBC 16.26 elevated on admission. Reported fever at home yesterday improved w/ Tylenol. Likely in the setting of cholecystitis. procal 0.25 elevated s/p abx IV Ceftriaxone x 5 days and Azithromycin x 1 in ED. RUQUS, CTAP and HIDA revealed cholecystitis. Pt continued on ceftriaxone and flagyl. Pt will be discharged with  cefpodoxime and flagyl to complete full course. Pt will follow up with Dr. Gomez outpatient.    #Diabetes mellitus.   ·  Plan: found to have elevated glucose on BMP  A1c found to be 7.7. no hx of diabetes. Endocrinology consulted, pt received moderate dose sliding scale and trajenta 5mg qAM. Pt will be discharged with trajenta 5mg. Pt will follow up with pcp outpatient.      #Hyponatremia.   ·  Plan: Na 126 on admission. On lasix 40mg IV qD. Likely hypovolemic hyponatremia iso of decreased po intake vs diuretic(presented with na 126) vs siadh iso of pain. Feurea 50.2%. Nephrology consulted. Pt was given bolus of ns multiple times. Hyponatremia corrected to XXX. Pt will follow up with pcp outpatient.    - nephrology consulted, f/u recs  - gentle hydration  - continue to monitor.    #HTN (hypertension).   ·  Plan: Receoved home metoprolol 12.5mg qd.    #HLD (hyperlipidemia).   ·  Plan: Received lipitor 40mg qd (alternate for home Crestor 20mg qd).    #Hypothyroid.   ·  Plan: Received home Synthroid 50mcg qd. TSH at 10.160. Home med synthroid 50mcg. Repeat tsh at 6.990    #History of BPH.   ·  Plan: per Daughter, pt's home Flomax d/c recently as was not beneficial. Pt has been voiding on his own.    #CANDELARIA (iron deficiency anemia).   ·  Plan; Total iron at 28, TIBC at 208, Iron saturation at 13, Ferritin at 622, transferrin at 161. Pt will follow up with pcp outpatient.    New medications/therapies: cefpodoxime 200mg BID until 10/22, flagyl until 10/22  New lines/hardware: none  Labs to be followed outpatient: none  Exam to be followed outpatient: none    Discharge plan: discharge to home    Physical Exam Upon Discharge:  General: in no acute distress  Eyes: EOMI intact bilaterally  HENT: Moist mucous membranes  Neck: Trachea midline  Lungs: CTA B/L. No wheezes, rales, or rhonchi  Cardiovascular: RRR. No murmurs, rubs, or gallops  Abdomen: Soft, non-tender non-distended  Extremities: WWP, No clubbing, cyanosis or edema  Neurological: Alert and oriented  Skin: Warm and dry     #Discharge: do not delete    Patient is a 84M, (shellfish rxn w/ Lip Swelling ~ 20 yrs ago),  w/ PMHx CAD s/p JOSÉ to mLAD (6/2023), HTN, HLD, cervical spondylosis s/p laminectomy, gastric ulcers, hypothyroidism,  who presents to [facility] ED 10/6/23 for BELLO with minimal exertion, chest pressure, and orthopnea x 3 days. EKG non ischemic, Troponin T negative. Echo revealed mildly dilated right ventricular size with mildly reduced right ventricular systolic function. RUQUS revealed cholelithiasis, CTAP with acute cholecystitis with fat stranding, HIDA had non visualization of the gallbladder indicating cholecystis. General surgery consulted and recommended no surgical intervention, percutaneous cholecystostomy and abx. IR consulted, and pt at bleed risk secondary to clopidogrel. Pt received ctx 2g and flagyl. Leukocytes downtrending, pt has been clinically stable with mild abdominal pain. Pt will continue abx until 10/22. Pt will follow up with Dr. Gomez outpatient.      Problem List/Main Diagnoses:   #Acute hypoxic respiratory failure.   ·  Plan: Presents w/ BELLO w/ minimal exertion, chest pain, orthopnea (2 pillow use)  x 3 days. proBNP 1279. Prior hx CAD: JOSÉ mLAD in 6/2023. Compliant w/ DAPT aspirin/Plavix. Received Lasix 20mg IV x 1 in ED. EKG nonischemic. Troponin T x 1 negative. s/p lasix 50mg IVP. Cath denied.  Pt presented with acs sxs initially and admitted to cardiology service to r/o cardiac causes. Pt initally required oxygen, and was thought to be secondary to acute chf excerbation. Pt found to have cholecystits on CTAP and sxs are likely due to cholecystits. Pt is not complaining of shortness of breath or chest pain. Currently on 2L. CXR revealed right hemidiaphragm. Acute hypoxic respiratory failure likely secondary to cholecystitis pain and hemidiaphragm. Pt will go home with home oxygen. Pt will follow up outpatient.    #Abdominal pain.   ·  Plan: Reports sharp pinpoint RUQ discomfort worsened with palpation x 1-2 days. Last BM 10/3 w/ suppository at home. Daughter reports decreased eating and fluid intake at home lately. RUQUS reveled cholelithiasis, CTAP revealed acute cholecystitis with fat stranding, HIDA had non visualization of the gallbladder. Pt received ceftriaxone and flagyl during admission. Pt will need to continue cefpodoxime and flagyl at home. Pt will follow up with Dr. Gomez.    #Leukocytosis.   ·  Plan: WBC 16.26 elevated on admission. Reported fever at home yesterday improved w/ Tylenol. Likely in the setting of cholecystitis. procal 0.25 elevated s/p abx IV Ceftriaxone x 5 days and Azithromycin x 1 in ED. RUQUS, CTAP and HIDA revealed cholecystitis. Pt continued on ceftriaxone and flagyl. Pt will be discharged with  cefpodoxime and flagyl to complete full course. Pt will follow up with Dr. Gomez outpatient.    #Diabetes mellitus.   ·  Plan: found to have elevated glucose on BMP  A1c found to be 7.7. no hx of diabetes. Endocrinology consulted, pt received moderate dose sliding scale and trajenta 5mg qAM. Pt will be discharged with trajenta 5mg. Pt will follow up with pcp outpatient.      #Hyponatremia.   ·  Plan: Na 126 on admission. On lasix 40mg IV qD. Likely hypovolemic hyponatremia iso of decreased po intake vs diuretic(presented with na 126) vs siadh iso of pain. Feurea 50.2%. Nephrology consulted. Pt was given bolus of ns multiple times. Hyponatremia corrected to 129. Pt will follow up with pcp outpatient.    #HTN (hypertension).   ·  Plan: Receoved home metoprolol 12.5mg qd.    #HLD (hyperlipidemia).   ·  Plan: Received lipitor 40mg qd (alternate for home Crestor 20mg qd).    #Hypothyroid.   ·  Plan: Received home Synthroid 50mcg qd. TSH at 10.160. Home med synthroid 50mcg. Repeat tsh at 6.990    #History of BPH.   ·  Plan: per Daughter, pt's home Flomax d/c recently as was not beneficial. Pt has been voiding on his own.    #CANDELARIA (iron deficiency anemia).   ·  Plan; Total iron at 28, TIBC at 208, Iron saturation at 13, Ferritin at 622, transferrin at 161. Pt will follow up with pcp outpatient.    New medications/therapies: cefpodoxime 200mg BID until 10/22, flagyl until 10/22  New lines/hardware: none  Labs to be followed outpatient: none  Exam to be followed outpatient: none    Discharge plan: discharge to home    Physical Exam Upon Discharge:  General: in no acute distress  Eyes: EOMI intact bilaterally  HENT: Moist mucous membranes  Neck: Trachea midline  Lungs: CTA B/L. No wheezes, rales, or rhonchi  Cardiovascular: RRR. No murmurs, rubs, or gallops  Abdomen: Soft, non-tender non-distended  Extremities: WWP, No clubbing, cyanosis or edema  Neurological: Alert and oriented  Skin: Warm and dry     #Discharge: do not delete    Patient is a 84M, (shellfish rxn w/ Lip Swelling ~ 20 yrs ago),  w/ PMHx CAD s/p JOSÉ to mLAD (6/2023), HTN, HLD, cervical spondylosis s/p laminectomy, gastric ulcers, hypothyroidism,  who presents to [facility] ED 10/6/23 for BELLO with minimal exertion, chest pressure, and orthopnea x 3 days. EKG non ischemic, Troponin T negative. Echo revealed mildly dilated right ventricular size with mildly reduced right ventricular systolic function. RUQUS revealed cholelithiasis, CTAP with acute cholecystitis with fat stranding, HIDA had non visualization of the gallbladder indicating cholecystis. General surgery consulted and recommended no surgical intervention, percutaneous cholecystostomy and abx. IR consulted, and pt at bleed risk secondary to clopidogrel. Pt received ctx 2g and flagyl. Leukocytes downtrending, pt has been clinically stable with mild abdominal pain. Pt will continue abx until 10/22. Pt will follow up with Dr. Gomez outpatient.      Problem List/Main Diagnoses:   #Acute hypoxic respiratory failure.   ·  Plan: Presents w/ BELLO w/ minimal exertion, chest pain, orthopnea (2 pillow use)  x 3 days. proBNP 1279. Prior hx CAD: JOSÉ mLAD in 6/2023. Compliant w/ DAPT aspirin/Plavix. Received Lasix 20mg IV x 1 in ED. EKG nonischemic. Troponin T x 1 negative. s/p lasix 50mg IVP. Cath denied.  Pt presented with acs sxs initially and admitted to cardiology service to r/o cardiac causes. Pt initally required oxygen, and was thought to be secondary to acute chf excerbation. Pt found to have cholecystits on CTAP and sxs are likely due to cholecystits. Pt is not complaining of shortness of breath or chest pain. Currently on 2L. CXR revealed right hemidiaphragm. Acute hypoxic respiratory failure likely secondary to cholecystitis pain and hemidiaphragm. Pt will go home with home oxygen. Pt will follow up outpatient.    #Abdominal pain.   ·  Plan: Reports sharp pinpoint RUQ discomfort worsened with palpation x 1-2 days. Last BM 10/3 w/ suppository at home. Daughter reports decreased eating and fluid intake at home lately. RUQUS reveled cholelithiasis, CTAP revealed acute cholecystitis with fat stranding, HIDA had non visualization of the gallbladder. Pt received ceftriaxone and flagyl during admission. Pt will need to continue cefpodoxime and flagyl at home. Pt will follow up with Dr. Gomez.    #Leukocytosis.   ·  Plan: WBC 16.26 elevated on admission. Reported fever at home yesterday improved w/ Tylenol. Likely in the setting of cholecystitis. procal 0.25 elevated s/p abx IV Ceftriaxone x 5 days and Azithromycin x 1 in ED. RUQUS, CTAP and HIDA revealed cholecystitis. Pt continued on ceftriaxone and flagyl. Pt will be discharged with  cefpodoxime and flagyl to complete full course. Pt will follow up with Dr. Gomez outpatient.    #Diabetes mellitus.   ·  Plan: found to have elevated glucose on BMP  A1c found to be 7.7. no hx of diabetes. Endocrinology consulted, pt received moderate dose sliding scale and trajenta 5mg qAM. Pt will be discharged with trajenta 5mg. Pt will follow up with pcp outpatient.      #Hyponatremia.   ·  Plan: Na 126 on admission. On lasix 40mg IV qD. Likely hypovolemic hyponatremia iso of decreased po intake vs diuretic(presented with na 126) vs siadh iso of pain. Feurea 50.2%. Nephrology consulted. Pt was given bolus of ns multiple times. Hyponatremia corrected to 129. Pt will follow up with pcp outpatient.    #HTN (hypertension).   ·  Plan: Receoved home metoprolol 12.5mg qd.    #HLD (hyperlipidemia).   ·  Plan: Received lipitor 40mg qd (alternate for home Crestor 20mg qd).    #Hypothyroid.   ·  Plan: Received home Synthroid 50mcg qd. TSH at 10.160. Home med synthroid 50mcg. Repeat tsh at 6.990    #History of BPH.   ·  Plan: per Daughter, pt's home Flomax d/c recently as was not beneficial. Pt has been voiding on his own.    #CANDELARIA (iron deficiency anemia).   ·  Plan; Total iron at 28, TIBC at 208, Iron saturation at 13, Ferritin at 622, transferrin at 161. Pt will follow up with pcp outpatient.    New medications/therapies: cefpodoxime 200mg BID until 10/22, flagyl until 10/22  New lines/hardware: none  Labs to be followed outpatient: none  Exam to be followed outpatient: none    Discharge plan: discharge to home    Physical Exam Upon Discharge:  General: in no acute distress  Eyes: EOMI intact bilaterally  HENT: Moist mucous membranes  Neck: Trachea midline  Lungs: CTA B/L. No wheezes, rales, or rhonchi  Cardiovascular: RRR. No murmurs, rubs, or gallops  Abdomen: Soft, non-tender non-distended  Extremities: WWP, No clubbing, cyanosis or edema  Neurological: Alert and oriented  Skin: Warm and dry     #Discharge: do not delete    Patient is a 84M, (shellfish rxn w/ Lip Swelling ~ 20 yrs ago),  w/ PMHx CAD s/p JOSÉ to mLAD (6/2023), HTN, HLD, cervical spondylosis s/p laminectomy, gastric ulcers, hypothyroidism,  who presents to [facility] ED 10/6/23 for BELLO with minimal exertion, chest pressure, and orthopnea x 3 days. EKG non ischemic, Troponin T negative. Echo revealed mildly dilated right ventricular size with mildly reduced right ventricular systolic function. RUQUS revealed cholelithiasis, CTAP with acute cholecystitis with fat stranding, HIDA had non visualization of the gallbladder indicating cholecystis. General surgery consulted and recommended no surgical intervention, percutaneous cholecystostomy and abx. IR consulted, and pt at bleed risk secondary to clopidogrel. Pt received ctx 2g and flagyl. Leukocytes downtrending, pt has been clinically stable with mild abdominal pain. Pt will continue abx until 10/22. Pt will follow up with Dr. Gomez outpatient.      Problem List/Main Diagnoses:   #Acute hypoxic respiratory failure.   ·  Plan: Presents w/ BELLO w/ minimal exertion, chest pain, orthopnea (2 pillow use)  x 3 days. proBNP 1279. Prior hx CAD: JOSÉ mLAD in 6/2023. Compliant w/ DAPT aspirin/Plavix. Received Lasix 20mg IV x 1 in ED. EKG nonischemic. Troponin T x 1 negative. s/p lasix 50mg IVP. Cath denied.  Pt presented with acs sxs initially and admitted to cardiology service to r/o cardiac causes. Pt initally required oxygen, and was thought to be secondary to acute chf excerbation. Pt found to have cholecystits on CTAP and sxs are likely due to cholecystits. Pt is not complaining of shortness of breath or chest pain. Currently on 2L. CXR revealed right hemidiaphragm. Acute hypoxic respiratory failure likely secondary to cholecystitis pain and hemidiaphragm. Pt will go home with home oxygen. Pt will follow up outpatient.    #Abdominal pain.   ·  Plan: Reports sharp pinpoint RUQ discomfort worsened with palpation x 1-2 days. Last BM 10/3 w/ suppository at home. Daughter reports decreased eating and fluid intake at home lately. RUQUS reveled cholelithiasis, CTAP revealed acute cholecystitis with fat stranding, HIDA had non visualization of the gallbladder. Pt received ceftriaxone and flagyl during admission. Pt will need to continue cefpodoxime and flagyl at home. Pt will follow up with Dr. Gomez.    #Leukocytosis.   ·  Plan: WBC 16.26 elevated on admission. Reported fever at home yesterday improved w/ Tylenol. Likely in the setting of cholecystitis. procal 0.25 elevated s/p abx IV Ceftriaxone x 5 days and Azithromycin x 1 in ED. RUQUS, CTAP and HIDA revealed cholecystitis. Pt continued on ceftriaxone and flagyl. Pt will be discharged with  cefpodoxime and flagyl to complete full course. Pt will follow up with Dr. Gomez outpatient.    #Diabetes mellitus.   ·  Plan: found to have elevated glucose on BMP  A1c found to be 7.7. no hx of diabetes. Endocrinology consulted, pt received moderate dose sliding scale and trajenta 5mg qAM. Pt will be discharged with trajenta 5mg. Pt will follow up with pcp outpatient.      #Hyponatremia.   ·  Plan: Na 126 on admission. On lasix 40mg IV qD. Likely hypovolemic hyponatremia iso of decreased po intake vs diuretic(presented with na 126) vs siadh iso of pain. Feurea 50.2%. Nephrology consulted. Pt was given bolus of ns multiple times. Hyponatremia corrected to 129. Pt will follow up with pcp outpatient.    #HTN (hypertension).   ·  Plan: Receoved home metoprolol 12.5mg qd.    #HLD (hyperlipidemia).   ·  Plan: Received lipitor 40mg qd (alternate for home Crestor 20mg qd).    #Hypothyroid.   ·  Plan: Received home Synthroid 50mcg qd. TSH at 10.160. Home med synthroid 50mcg. Repeat tsh at 6.990    #History of BPH.   ·  Plan: per Daughter, pt's home Flomax d/c recently as was not beneficial. Pt has been voiding on his own.    #CANDELARIA (iron deficiency anemia).   ·  Plan; Total iron at 28, TIBC at 208, Iron saturation at 13, Ferritin at 622, transferrin at 161. Pt will follow up with pcp outpatient.    New medications/therapies: cefpodoxime 200mg BID until 10/22, flagyl until 10/22  New lines/hardware: none  Labs to be followed outpatient: none  Exam to be followed outpatient: none    Discharge plan: discharge to home    Physical Exam Upon Discharge:  General: in no acute distress  Eyes: EOMI intact bilaterally  HENT: Moist mucous membranes  Neck: Trachea midline  Lungs: CTA B/L. No wheezes, rales, or rhonchi  Cardiovascular: RRR. No murmurs, rubs, or gallops  Abdomen: Soft, non-tender non-distended  Extremities: WWP, No clubbing, cyanosis or edema  Neurological: Alert and oriented  Skin: Warm and dry     #Discharge: do not delete    Patient is a 84M, (shellfish rxn w/ Lip Swelling ~ 20 yrs ago),  w/ PMHx CAD s/p JOSÉ to mLAD (6/2023), HTN, HLD, cervical spondylosis s/p laminectomy, gastric ulcers, hypothyroidism,  who presents to [facility] ED 10/6/23 for BELLO with minimal exertion, chest pressure, and orthopnea x 3 days. EKG non ischemic, Troponin T negative. Echo revealed mildly dilated right ventricular size with mildly reduced right ventricular systolic function. RUQUS revealed cholelithiasis, CTAP with acute cholecystitis with fat stranding, HIDA had non visualization of the gallbladder indicating cholecystis. General surgery consulted and recommended no surgical intervention, percutaneous cholecystostomy and abx. IR consulted, and pt at bleed risk secondary to clopidogrel. Pt received ctx 2g and flagyl. Leukocytes downtrending, pt has been clinically stable with mild abdominal pain. Pt will continue abx until 10/22. Pt will follow up with Dr. Gomez outpatient.      Problem List/Main Diagnoses:   #Acute hypoxic respiratory failure.   ·  Plan: Presents w/ BELLO w/ minimal exertion, chest pain, orthopnea (2 pillow use)  x 3 days. proBNP 1279. Prior hx CAD: JOSÉ mLAD in 6/2023. Compliant w/ DAPT aspirin/Plavix. Received Lasix 20mg IV x 1 in ED. EKG nonischemic. Troponin T x 1 negative. s/p lasix 50mg IVP. Cath denied.  Pt presented with acs sxs initially and admitted to cardiology service to r/o cardiac causes. Pt initally required oxygen, and was thought to be secondary to acute chf excerbation. Pt found to have cholecystits on CTAP and sxs are likely due to cholecystits. Pt is not complaining of shortness of breath or chest pain. Currently on 2L. CXR revealed right hemidiaphragm. Acute hypoxic respiratory failure likely secondary to cholecystitis pain and hemidiaphragm. Pt will go home with home oxygen. Pt will follow up outpatient.    #Abdominal pain.   ·  Plan: Reports sharp pinpoint RUQ discomfort worsened with palpation x 1-2 days. Last BM 10/3 w/ suppository at home. Daughter reports decreased eating and fluid intake at home lately. RUQUS reveled cholelithiasis, CTAP revealed acute cholecystitis with fat stranding, HIDA had non visualization of the gallbladder. Pt received ceftriaxone and flagyl during admission. Pt will need to continue cefpodoxime and flagyl at home. Pt will follow up with Dr. Gomez.    #Leukocytosis.   ·  Plan: WBC 16.26 elevated on admission. Reported fever at home yesterday improved w/ Tylenol. Likely in the setting of cholecystitis. procal 0.25 elevated s/p abx IV Ceftriaxone x 5 days and Azithromycin x 1 in ED. RUQUS, CTAP and HIDA revealed cholecystitis. Pt continued on ceftriaxone and flagyl. Pt will be discharged with  cefpodoxime and flagyl to complete full course. Pt will follow up with Dr. Gomez outpatient.    #Diabetes mellitus.   ·  Plan: found to have elevated glucose on BMP  A1c found to be 7.7. no hx of diabetes. Endocrinology consulted, pt received moderate dose sliding scale and trajenta 5mg qAM. Pt will be discharged with trajenta 5mg. Pt will follow up with pcp outpatient.    #Hyponatremia.   ·  Plan: Na 126 on admission. On lasix 40mg IV qD. Likely hypovolemic hyponatremia iso of decreased po intake vs diuretic(presented with na 126) vs siadh iso of pain. Feurea 50.2%. Nephrology consulted. Pt was given bolus of ns multiple times. Hyponatremia corrected to 129. Pt will follow up with pcp outpatient.    #HTN (hypertension).   ·  Plan: Receoved home metoprolol 12.5mg qd.    #HLD (hyperlipidemia).   ·  Plan: Received lipitor 40mg qd (alternate for home Crestor 20mg qd).    #Hypothyroid.   ·  Plan: TSH at 10.160. Home med synthroid 50mcg. Repeat tsh at 6.990. Endocrinology consulted, recommended synthroid 75mcg.    #History of BPH.   ·  Plan: per Daughter, pt's home Flomax d/c recently as was not beneficial. Pt has been voiding on his own.    #CANDELARIA (iron deficiency anemia).   ·  Plan; Total iron at 28, TIBC at 208, Iron saturation at 13, Ferritin at 622, transferrin at 161. Pt will follow up with pcp outpatient.    New medications/therapies: cefpodoxime 200mg BID until 10/22, flagyl until 10/22  New lines/hardware: none  Labs to be followed outpatient: none  Exam to be followed outpatient: none    Discharge plan: discharge to home    Physical Exam Upon Discharge:  General: in no acute distress  Eyes: EOMI intact bilaterally  HENT: Moist mucous membranes  Neck: Trachea midline  Lungs: CTA B/L. No wheezes, rales, or rhonchi  Cardiovascular: RRR. No murmurs, rubs, or gallops  Abdomen: Soft, non-tender non-distended  Extremities: WWP, No clubbing, cyanosis or edema  Neurological: Alert and oriented  Skin: Warm and dry     #Discharge: do not delete    Patient is a 84M, (shellfish rxn w/ Lip Swelling ~ 20 yrs ago),  w/ PMHx CAD s/p JOSÉ to mLAD (6/2023), HTN, HLD, cervical spondylosis s/p laminectomy, gastric ulcers, hypothyroidism,  who presents to [facility] ED 10/6/23 for BELLO with minimal exertion, chest pressure, and orthopnea x 3 days. EKG non ischemic, Troponin T negative. Echo revealed mildly dilated right ventricular size with mildly reduced right ventricular systolic function. RUQUS revealed cholelithiasis, CTAP with acute cholecystitis with fat stranding, HIDA had non visualization of the gallbladder indicating cholecystis. General surgery consulted and recommended no surgical intervention, percutaneous cholecystostomy and abx. IR consulted, and pt at bleed risk secondary to clopidogrel. Pt received ctx 2g and flagyl. Leukocytes downtrending, pt has been clinically stable with mild abdominal pain. Pt will continue abx until 10/22. Pt will follow up with Dr. Gomez outpatient.      Problem List/Main Diagnoses:   #Acute hypoxic respiratory failure.   ·  Plan: Presents w/ BELLO w/ minimal exertion, chest pain, orthopnea (2 pillow use)  x 3 days. proBNP 1279. Prior hx CAD: JOSÉ mLAD in 6/2023. Compliant w/ DAPT aspirin/Plavix. Received Lasix 20mg IV x 1 in ED. EKG nonischemic. Troponin T x 1 negative. s/p lasix 50mg IVP. Cath denied.  Pt presented with acs sxs initially and admitted to cardiology service to r/o cardiac causes. Pt initally required oxygen, and was thought to be secondary to acute chf excerbation. Pt found to have cholecystits on CTAP and sxs are likely due to cholecystits. Pt is not complaining of shortness of breath or chest pain. Currently on 2L. CXR revealed right hemidiaphragm. Acute hypoxic respiratory failure likely secondary to cholecystitis pain and hemidiaphragm. Pt will go home with home oxygen. Pt will follow up outpatient.    #Abdominal pain.   ·  Plan: Reports sharp pinpoint RUQ discomfort worsened with palpation x 1-2 days. Last BM 10/3 w/ suppository at home. Daughter reports decreased eating and fluid intake at home lately. RUQUS reveled cholelithiasis, CTAP revealed acute cholecystitis with fat stranding, HIDA had non visualization of the gallbladder. Pt received ceftriaxone and flagyl during admission. Pt will need to continue cefpodoxime and flagyl at home. Pt will follow up with Dr. Gomez.    #Leukocytosis.   ·  Plan: WBC 16.26 elevated on admission. Reported fever at home yesterday improved w/ Tylenol. Likely in the setting of cholecystitis. procal 0.25 elevated s/p abx IV Ceftriaxone x 5 days and Azithromycin x 1 in ED. RUQUS, CTAP and HIDA revealed cholecystitis. Pt continued on ceftriaxone and flagyl. Pt will be discharged with  cefpodoxime and flagyl to complete full course. Pt will follow up with Dr. Gomez outpatient.    #Diabetes mellitus.   ·  Plan: found to have elevated glucose on BMP  A1c found to be 7.7. no hx of diabetes. Endocrinology consulted, pt received moderate dose sliding scale and trajenta 5mg qAM. Pt will be discharged with trajenta 5mg. Pt will follow up with pcp outpatient.    #Hyponatremia.   ·  Plan: Na 126 on admission. On lasix 40mg IV qD. Likely hypovolemic hyponatremia iso of decreased po intake vs diuretic(presented with na 126) vs siadh iso of pain. Feurea 50.2%. Nephrology consulted. Pt was given bolus of ns multiple times. Hyponatremia corrected to 129. Pt will follow up with pcp outpatient.    #HTN (hypertension).   ·  Plan: Receoved home metoprolol 12.5mg qd.    #HLD (hyperlipidemia).   ·  Plan: Received lipitor 40mg qd (alternate for home Crestor 20mg qd).    #Hypothyroid.   ·  Plan: TSH at 10.160. Home med synthroid 50mcg. Repeat tsh at 6.990. Endocrinology consulted, recommended synthroid 75mcg.    #History of BPH.   ·  Plan: per Daughter, pt's home Flomax d/c recently as was not beneficial. Pt has been voiding on his own.    #CANDELARIA (iron deficiency anemia).   ·  Plan; Total iron at 28, TIBC at 208, Iron saturation at 13, Ferritin at 622, transferrin at 161. Pt will follow up with pcp outpatient.    New medications/therapies: cefpodoxime 200mg BID until 10/22, flagyl until 10/22  New lines/hardware: none  Labs to be followed outpatient: none  Exam to be followed outpatient: none    Discharge plan: discharge to home    Physical Exam Upon Discharge:  General: in no acute distress  Eyes: EOMI intact bilaterally  HENT: Moist mucous membranes  Neck: Trachea midline  Lungs: CTA B/L. No wheezes, rales, or rhonchi  Cardiovascular: RRR. No murmurs, rubs, or gallops  Abdomen: Soft, non-tender non-distended  Extremities: WWP, No clubbing, cyanosis or edema  Neurological: Alert and oriented  Skin: Warm and dry     #Discharge: do not delete    Patient is a 84M, (shellfish rxn w/ Lip Swelling ~ 20 yrs ago),  w/ PMHx CAD s/p JOSÉ to mLAD (6/2023), HTN, HLD, cervical spondylosis s/p laminectomy, gastric ulcers, hypothyroidism,  who presents to [facility] ED 10/6/23 for BELLO with minimal exertion, chest pressure, and orthopnea x 3 days. EKG non ischemic, Troponin T negative. Echo revealed mildly dilated right ventricular size with mildly reduced right ventricular systolic function. RUQUS revealed cholelithiasis, CTAP with acute cholecystitis with fat stranding, HIDA had non visualization of the gallbladder indicating cholecystis. General surgery consulted and recommended no surgical intervention, percutaneous cholecystostomy and abx. IR consulted, and pt at bleed risk secondary to clopidogrel. Pt received ctx 2g and flagyl. Leukocytes downtrending, pt has been clinically stable with mild abdominal pain. Pt will continue abx until 10/22. Pt will follow up with Dr. Gomez outpatient.      Problem List/Main Diagnoses:   #Acute hypoxic respiratory failure.   ·  Plan: Presents w/ BELLO w/ minimal exertion, chest pain, orthopnea (2 pillow use)  x 3 days. proBNP 1279. Prior hx CAD: JOSÉ mLAD in 6/2023. Compliant w/ DAPT aspirin/Plavix. Received Lasix 20mg IV x 1 in ED. EKG nonischemic. Troponin T x 1 negative. s/p lasix 50mg IVP. Cath denied.  Pt presented with acs sxs initially and admitted to cardiology service to r/o cardiac causes. Pt initally required oxygen, and was thought to be secondary to acute chf excerbation. Pt found to have cholecystits on CTAP and sxs are likely due to cholecystits. Pt is not complaining of shortness of breath or chest pain. Currently on 2L. CXR revealed right hemidiaphragm. Acute hypoxic respiratory failure likely secondary to cholecystitis pain and hemidiaphragm. Pt will go home with home oxygen. Pt will follow up outpatient.    #Abdominal pain.   ·  Plan: Reports sharp pinpoint RUQ discomfort worsened with palpation x 1-2 days. Last BM 10/3 w/ suppository at home. Daughter reports decreased eating and fluid intake at home lately. RUQUS reveled cholelithiasis, CTAP revealed acute cholecystitis with fat stranding, HIDA had non visualization of the gallbladder. Pt received ceftriaxone and flagyl during admission. Pt will need to continue cefpodoxime and flagyl at home. Pt will follow up with Dr. Gomez.    #Leukocytosis.   ·  Plan: WBC 16.26 elevated on admission. Reported fever at home yesterday improved w/ Tylenol. Likely in the setting of cholecystitis. procal 0.25 elevated s/p abx IV Ceftriaxone x 5 days and Azithromycin x 1 in ED. RUQUS, CTAP and HIDA revealed cholecystitis. Pt continued on ceftriaxone and flagyl. Pt will be discharged with  cefpodoxime and flagyl to complete full course. Pt will follow up with Dr. Gomez outpatient.    #Diabetes mellitus.   ·  Plan: found to have elevated glucose on BMP  A1c found to be 7.7. no hx of diabetes. Endocrinology consulted, pt received moderate dose sliding scale and trajenta 5mg qAM. Pt will be discharged with trajenta 5mg. Pt will follow up with pcp outpatient.    #Hyponatremia.   ·  Plan: Na 126 on admission. On lasix 40mg IV qD. Likely hypovolemic hyponatremia iso of decreased po intake vs diuretic(presented with na 126) vs siadh iso of pain. Feurea 50.2%. Nephrology consulted. Pt was given bolus of ns multiple times. Hyponatremia corrected to 129. Pt will follow up with pcp outpatient.    #HTN (hypertension).   ·  Plan: Receoved home metoprolol 12.5mg qd.    #HLD (hyperlipidemia).   ·  Plan: Received lipitor 40mg qd (alternate for home Crestor 20mg qd).    #Hypothyroid.   ·  Plan: TSH at 10.160. Home med synthroid 50mcg. Repeat tsh at 6.990. Endocrinology consulted, recommended synthroid 75mcg.    #History of BPH.   ·  Plan: per Daughter, pt's home Flomax d/c recently as was not beneficial. Pt has been voiding on his own.    #CANDELARIA (iron deficiency anemia).   ·  Plan; Total iron at 28, TIBC at 208, Iron saturation at 13, Ferritin at 622, transferrin at 161. Pt will follow up with pcp outpatient.    New medications/therapies: cefpodoxime 200mg BID until 10/22, flagyl until 10/22  New lines/hardware: none  Labs to be followed outpatient: none  Exam to be followed outpatient: none    Discharge plan: discharge to home    Physical Exam Upon Discharge:  General: in no acute distress  Eyes: EOMI intact bilaterally  HENT: Moist mucous membranes  Neck: Trachea midline  Lungs: CTA B/L. No wheezes, rales, or rhonchi  Cardiovascular: RRR. No murmurs, rubs, or gallops  Abdomen: Soft, non-tender non-distended  Extremities: WWP, No clubbing, cyanosis or edema  Neurological: Alert and oriented  Skin: Warm and dry     #Discharge: do not delete    Patient is a 84M, (shellfish rxn w/ Lip Swelling ~ 20 yrs ago),  w/ PMHx CAD s/p JOSÉ to mLAD (6/2023), HTN, HLD, cervical spondylosis s/p laminectomy, gastric ulcers, hypothyroidism,  who presents to [facility] ED 10/6/23 for BELLO with minimal exertion, chest pressure, and orthopnea x 3 days. EKG non ischemic, Troponin T negative. Echo revealed mildly dilated right ventricular size with mildly reduced right ventricular systolic function. RUQUS revealed cholelithiasis, CTAP with acute cholecystitis with fat stranding, HIDA had non visualization of the gallbladder indicating cholecystis. General surgery consulted and recommended no surgical intervention, percutaneous cholecystostomy and abx. IR consulted, and pt at bleed risk secondary to clopidogrel. Pt received ctx 2g and flagyl. Leukocytes downtrending, pt has been clinically stable with mild abdominal pain. Pt will continue abx until 10/22. Pt will follow up with Dr. Gomez outpatient.      Problem List/Main Diagnoses:   #Acute hypoxic respiratory failure.   ·  Plan: Presents w/ BELLO w/ minimal exertion, chest pain, orthopnea (2 pillow use)  x 3 days. proBNP 1279. Prior hx CAD: JOSÉ mLAD in 6/2023. Compliant w/ DAPT aspirin/Plavix. Received Lasix 20mg IV x 1 in ED. EKG nonischemic. Troponin T x 1 negative. s/p lasix 50mg IVP. Cath denied.  Pt presented with acs sxs initially and admitted to cardiology service to r/o cardiac causes. Pt initally required oxygen, and was thought to be secondary to acute chf excerbation. Pt found to have cholecystits on CTAP and sxs are likely due to cholecystits. Pt is not complaining of shortness of breath or chest pain. Currently on 2L. CXR revealed right hemidiaphragm. Acute hypoxic respiratory failure likely secondary to cholecystitis pain and hemidiaphragm. Pt will go home with home oxygen. Pt will follow up outpatient.    #Abdominal pain.   ·  Plan: Reports sharp pinpoint RUQ discomfort worsened with palpation x 1-2 days. Last BM 10/3 w/ suppository at home. Daughter reports decreased eating and fluid intake at home lately. RUQUS reveled cholelithiasis, CTAP revealed acute cholecystitis with fat stranding, HIDA had non visualization of the gallbladder. Pt received ceftriaxone and flagyl during admission. Pt will need to continue cefpodoxime and flagyl at home. Pt will follow up with Dr. Gomez.    #Leukocytosis.   ·  Plan: WBC 16.26 elevated on admission. Reported fever at home yesterday improved w/ Tylenol. Likely in the setting of cholecystitis. procal 0.25 elevated s/p abx IV Ceftriaxone x 5 days and Azithromycin x 1 in ED. RUQUS, CTAP and HIDA revealed cholecystitis. Pt continued on ceftriaxone and flagyl. Pt will be discharged with  cefpodoxime and flagyl to complete full course. Pt will follow up with Dr. Gomez outpatient.    #Diabetes mellitus.   ·  Plan: found to have elevated glucose on BMP  A1c found to be 7.7. no hx of diabetes. Endocrinology consulted, pt received moderate dose sliding scale and trajenta 5mg qAM. Pt will be discharged with trajenta 5mg. Pt will follow up with pcp outpatient.    #Hyponatremia.   ·  Plan: Na 126 on admission. On lasix 40mg IV qD. Likely hypovolemic hyponatremia iso of decreased po intake vs diuretic(presented with na 126) vs siadh iso of pain. Feurea 50.2%. Nephrology consulted. Pt was given bolus of ns multiple times. Hyponatremia corrected to 129. Pt will follow up with pcp outpatient.    #HTN (hypertension).   ·  Plan: Receoved home metoprolol 12.5mg qd.    #HLD (hyperlipidemia).   ·  Plan: Received lipitor 40mg qd (alternate for home Crestor 20mg qd).    #Hypothyroid.   ·  Plan: TSH at 10.160. Home med synthroid 50mcg. Repeat tsh at 6.990. Endocrinology consulted, recommended synthroid 75mcg.    #History of BPH.   ·  Plan: per Daughter, pt's home Flomax d/c recently as was not beneficial. Pt has been voiding on his own.    #CANDELARIA (iron deficiency anemia).   ·  Plan; Total iron at 28, TIBC at 208, Iron saturation at 13, Ferritin at 622, transferrin at 161. Pt will follow up with pcp outpatient.    New medications/therapies: cefpodoxime 200mg BID until 10/23 flagyl 500mg TID until 10/23, synthroid 75mcg, linagliptin 5mg qD  New lines/hardware: none  Labs to be followed outpatient: none  Exam to be followed outpatient: none    Discharge plan: discharge to home    Physical Exam Upon Discharge:  General: in no acute distress  Eyes: EOMI intact bilaterally  HENT: Moist mucous membranes  Neck: Trachea midline  Lungs: CTA B/L. No wheezes, rales, or rhonchi  Cardiovascular: RRR. No murmurs, rubs, or gallops  Abdomen: Soft, non-tender non-distended  Extremities: WWP, No clubbing, cyanosis or edema  Neurological: Alert and oriented  Skin: Warm and dry     #Discharge: do not delete    Patient is a 84M, (shellfish rxn w/ Lip Swelling ~ 20 yrs ago),  w/ PMHx CAD s/p JOSÉ to mLAD (6/2023), HTN, HLD, cervical spondylosis s/p laminectomy, gastric ulcers, hypothyroidism,  who presents to [facility] ED 10/6/23 for BELLO with minimal exertion, chest pressure, and orthopnea x 3 days. EKG non ischemic, Troponin T negative. Echo revealed mildly dilated right ventricular size with mildly reduced right ventricular systolic function. RUQUS revealed cholelithiasis, CTAP with acute cholecystitis with fat stranding, HIDA had non visualization of the gallbladder indicating cholecystis. General surgery consulted and recommended no surgical intervention, percutaneous cholecystostomy and abx. IR consulted, and pt at bleed risk secondary to clopidogrel. Pt received ctx 2g and flagyl. Leukocytes downtrending, pt has been clinically stable with mild abdominal pain. Pt will continue abx until 10/22. Pt will follow up with Dr. Gomez outpatient.      Problem List/Main Diagnoses:   #Acute hypoxic respiratory failure.   ·  Plan: Presents w/ BELLO w/ minimal exertion, chest pain, orthopnea (2 pillow use)  x 3 days. proBNP 1279. Prior hx CAD: JOSÉ mLAD in 6/2023. Compliant w/ DAPT aspirin/Plavix. Received Lasix 20mg IV x 1 in ED. EKG nonischemic. Troponin T x 1 negative. s/p lasix 50mg IVP. Cath denied.  Pt presented with acs sxs initially and admitted to cardiology service to r/o cardiac causes. Pt initally required oxygen, and was thought to be secondary to acute chf excerbation. Pt found to have cholecystits on CTAP and sxs are likely due to cholecystits. Pt is not complaining of shortness of breath or chest pain. Currently on 2L. CXR revealed right hemidiaphragm. Acute hypoxic respiratory failure likely secondary to cholecystitis pain and hemidiaphragm. Pt will go home with home oxygen. Pt will follow up outpatient.    #Abdominal pain.   ·  Plan: Reports sharp pinpoint RUQ discomfort worsened with palpation x 1-2 days. Last BM 10/3 w/ suppository at home. Daughter reports decreased eating and fluid intake at home lately. RUQUS reveled cholelithiasis, CTAP revealed acute cholecystitis with fat stranding, HIDA had non visualization of the gallbladder. Pt received ceftriaxone and flagyl during admission. Pt will need to continue cefpodoxime and flagyl at home. Pt will follow up with Dr. Gomez.    #Leukocytosis.   ·  Plan: WBC 16.26 elevated on admission. Reported fever at home yesterday improved w/ Tylenol. Likely in the setting of cholecystitis. procal 0.25 elevated s/p abx IV Ceftriaxone x 5 days and Azithromycin x 1 in ED. RUQUS, CTAP and HIDA revealed cholecystitis. Pt continued on ceftriaxone and flagyl. Pt will be discharged with  cefpodoxime and flagyl to complete full course. Pt will follow up with Dr. Gomez outpatient.    #Diabetes mellitus.   ·  Plan: found to have elevated glucose on BMP  A1c found to be 7.7. no hx of diabetes. Endocrinology consulted, pt received moderate dose sliding scale and trajenta 5mg qAM. Pt will be discharged with trajenta 5mg. Pt will follow up with pcp outpatient.    #Hyponatremia.   ·  Plan: Na 126 on admission. On lasix 40mg IV qD. Likely hypovolemic hyponatremia iso of decreased po intake vs diuretic(presented with na 126) vs siadh iso of pain. Feurea 50.2%. Nephrology consulted. Pt was given bolus of ns multiple times. Hyponatremia corrected to 129. Pt will follow up with pcp outpatient.    #HTN (hypertension).   ·  Plan: Receoved home metoprolol 12.5mg qd.    #HLD (hyperlipidemia).   ·  Plan: Received lipitor 40mg qd (alternate for home Crestor 20mg qd).    #Hypothyroid.   ·  Plan: TSH at 10.160. Home med synthroid 50mcg. Repeat tsh at 6.990. Endocrinology consulted, recommended synthroid 75mcg.    #History of BPH.   ·  Plan: per Daughter, pt's home Flomax d/c recently as was not beneficial. Pt has been voiding on his own.    #CANDELARIA (iron deficiency anemia).   ·  Plan; Total iron at 28, TIBC at 208, Iron saturation at 13, Ferritin at 622, transferrin at 161. Pt will follow up with pcp outpatient.    New medications/therapies: cefpodoxime 200mg BID until 10/23 flagyl 500mg TID until 10/23, synthroid 75mcg, linagliptin 5mg qD  New lines/hardware: none  Labs to be followed outpatient: none  Exam to be followed outpatient: none    Discharge plan: discharge to home    Physical Exam Upon Discharge:  General: in no acute distress  Eyes: EOMI intact bilaterally  HENT: Moist mucous membranes  Neck: Trachea midline  Lungs: CTA B/L. No wheezes, rales, or rhonchi  Cardiovascular: RRR. No murmurs, rubs, or gallops  Abdomen: Soft, non-tender non-distended  Extremities: WWP, No clubbing, cyanosis or edema  Neurological: Alert and oriented  Skin: Warm and dry     #Discharge: do not delete    Patient is a 84M, (shellfish rxn w/ Lip Swelling ~ 20 yrs ago),  w/ PMHx CAD s/p JOSÉ to mLAD (6/2023), HTN, HLD, cervical spondylosis s/p laminectomy, gastric ulcers, hypothyroidism,  who presents to [facility] ED 10/6/23 for BELLO with minimal exertion, chest pressure, and orthopnea x 3 days. EKG non ischemic, Troponin T negative. Echo revealed mildly dilated right ventricular size with mildly reduced right ventricular systolic function. RUQUS revealed cholelithiasis, CTAP with acute cholecystitis with fat stranding, HIDA had non visualization of the gallbladder indicating cholecystis. General surgery consulted and recommended no surgical intervention, percutaneous cholecystostomy and abx. IR consulted, and pt at bleed risk secondary to clopidogrel. Pt received ctx 2g and flagyl. Leukocytes downtrending, pt has been clinically stable with mild abdominal pain. Pt will continue abx until 10/22. Pt will follow up with Dr. Gomez outpatient.      Problem List/Main Diagnoses:   #Acute hypoxic respiratory failure.   ·  Plan: Presents w/ BELLO w/ minimal exertion, chest pain, orthopnea (2 pillow use)  x 3 days. proBNP 1279. Prior hx CAD: JOSÉ mLAD in 6/2023. Compliant w/ DAPT aspirin/Plavix. Received Lasix 20mg IV x 1 in ED. EKG nonischemic. Troponin T x 1 negative. s/p lasix 50mg IVP. Cath denied.  Pt presented with acs sxs initially and admitted to cardiology service to r/o cardiac causes. Pt initally required oxygen, and was thought to be secondary to acute chf excerbation. Pt found to have cholecystits on CTAP and sxs are likely due to cholecystits. Pt is not complaining of shortness of breath or chest pain. Currently on 2L. CXR revealed right hemidiaphragm. Acute hypoxic respiratory failure likely secondary to cholecystitis pain and hemidiaphragm. Pt will go home with home oxygen. Pt will follow up outpatient.    #Abdominal pain.   ·  Plan: Reports sharp pinpoint RUQ discomfort worsened with palpation x 1-2 days. Last BM 10/3 w/ suppository at home. Daughter reports decreased eating and fluid intake at home lately. RUQUS reveled cholelithiasis, CTAP revealed acute cholecystitis with fat stranding, HIDA had non visualization of the gallbladder. Pt received ceftriaxone and flagyl during admission. Pt will need to continue cefpodoxime and flagyl at home. Pt will follow up with Dr. Gomez.    #Leukocytosis.   ·  Plan: WBC 16.26 elevated on admission. Reported fever at home yesterday improved w/ Tylenol. Likely in the setting of cholecystitis. procal 0.25 elevated s/p abx IV Ceftriaxone x 5 days and Azithromycin x 1 in ED. RUQUS, CTAP and HIDA revealed cholecystitis. Pt continued on ceftriaxone and flagyl. Pt will be discharged with  cefpodoxime and flagyl to complete full course. Pt will follow up with Dr. Gomez outpatient.    #Diabetes mellitus.   ·  Plan: found to have elevated glucose on BMP  A1c found to be 7.7. no hx of diabetes. Endocrinology consulted, pt received moderate dose sliding scale and trajenta 5mg qAM. Pt will be discharged with trajenta 5mg. Pt will follow up with pcp outpatient.    #Hyponatremia.   ·  Plan: Na 126 on admission. On lasix 40mg IV qD. Likely hypovolemic hyponatremia iso of decreased po intake vs diuretic(presented with na 126) vs siadh iso of pain. Feurea 50.2%. Nephrology consulted. Pt was given bolus of ns multiple times. Hyponatremia corrected to 129. Pt will follow up with pcp outpatient.    #HTN (hypertension).   ·  Plan: Receoved home metoprolol 12.5mg qd.    #HLD (hyperlipidemia).   ·  Plan: Received lipitor 40mg qd (alternate for home Crestor 20mg qd).    #Hypothyroid.   ·  Plan: TSH at 10.160. Home med synthroid 50mcg. Repeat tsh at 6.990. Endocrinology consulted, recommended synthroid 75mcg.    #History of BPH.   ·  Plan: per Daughter, pt's home Flomax d/c recently as was not beneficial. Pt has been voiding on his own.    #CANDELARIA (iron deficiency anemia).   ·  Plan; Total iron at 28, TIBC at 208, Iron saturation at 13, Ferritin at 622, transferrin at 161. Pt will follow up with pcp outpatient.    New medications/therapies: cefpodoxime 200mg BID until 10/23 flagyl 500mg TID until 10/23, synthroid 75mcg, linagliptin 5mg qD  New lines/hardware: none  Labs to be followed outpatient: none  Exam to be followed outpatient: none    Discharge plan: discharge to home    Physical Exam Upon Discharge:  General: in no acute distress  Eyes: EOMI intact bilaterally  HENT: Moist mucous membranes  Neck: Trachea midline  Lungs: CTA B/L. No wheezes, rales, or rhonchi  Cardiovascular: RRR. No murmurs, rubs, or gallops  Abdomen: Soft, non-tender non-distended  Extremities: WWP, No clubbing, cyanosis or edema  Neurological: Alert and oriented  Skin: Warm and dry     #Discharge: do not delete    Patient is a 84M, (shellfish rxn w/ Lip Swelling ~ 20 yrs ago),  w/ PMHx CAD s/p JOSÉ to mLAD (6/2023), HTN, HLD, cervical spondylosis s/p laminectomy, gastric ulcers, hypothyroidism,  who presents to [facility] ED 10/6/23 for BELLO with minimal exertion, chest pressure, and orthopnea x 3 days. EKG non ischemic, Troponin T negative. Echo revealed mildly dilated right ventricular size with mildly reduced right ventricular systolic function. RUQUS revealed cholelithiasis, CTAP with acute cholecystitis with fat stranding, HIDA had non visualization of the gallbladder indicating cholecystis. General surgery consulted and recommended no surgical intervention, percutaneous cholecystostomy and abx. IR consulted, and pt at bleed risk secondary to clopidogrel. Pt received ctx 2g and flagyl. Leukocytes downtrending, pt has been clinically stable with mild abdominal pain. Pt will continue abx until 10/22. Pt will follow up with Dr. Gomez outpatient.      Problem List/Main Diagnoses:   #Acute hypoxic respiratory failure.   ·  Plan: Presents w/ BELLO w/ minimal exertion, chest pain, orthopnea (2 pillow use)  x 3 days. proBNP 1279. Prior hx CAD: JOSÉ mLAD in 6/2023. Compliant w/ DAPT aspirin/Plavix. Received Lasix 20mg IV x 1 in ED. EKG nonischemic. Troponin T x 1 negative. s/p lasix 50mg IVP. Cath denied.  Pt presented with acs sxs initially and admitted to cardiology service to r/o cardiac causes. Pt initally required oxygen, and was thought to be secondary to acute chf excerbation. Pt found to have cholecystits on CTAP and sxs are likely due to cholecystits. Pt is not complaining of shortness of breath or chest pain. Currently on 2L. CXR revealed right hemidiaphragm. Acute hypoxic respiratory failure likely secondary to cholecystitis pain and hemidiaphragm. Pt will go home with home oxygen. Pt will follow up outpatient.    #Abdominal pain.   ·  Plan: Reports sharp pinpoint RUQ discomfort worsened with palpation x 1-2 days. Last BM 10/3 w/ suppository at home. Daughter reports decreased eating and fluid intake at home lately. RUQUS reveled cholelithiasis, CTAP revealed acute cholecystitis with fat stranding, HIDA had non visualization of the gallbladder. Pt received ceftriaxone and flagyl during admission. Pt will need to continue cefpodoxime and flagyl at home. Pt will follow up with Dr. Gomez.    #Leukocytosis.   ·  Plan: WBC 16.26 elevated on admission. Reported fever at home yesterday improved w/ Tylenol. Likely in the setting of cholecystitis. procal 0.25 elevated s/p abx IV Ceftriaxone x 5 days and Azithromycin x 1 in ED. RUQUS, CTAP and HIDA revealed cholecystitis. Pt continued on ceftriaxone and flagyl. Pt will be discharged with  cefpodoxime and flagyl to complete full course. Pt will follow up with Dr. Gomez outpatient.    #Diabetes mellitus.   ·  Plan: found to have elevated glucose on BMP  A1c found to be 7.7. no hx of diabetes. Endocrinology consulted, pt received moderate dose sliding scale and trajenta 5mg qAM. Pt will be discharged with trajenta 5mg. Pt will follow up with pcp outpatient.    #Hyponatremia.   ·  Plan: Na 126 on admission. On lasix 40mg IV qD. Likely hypovolemic hyponatremia iso of decreased po intake vs diuretic(presented with na 126) vs siadh iso of pain. Feurea 50.2%. Nephrology consulted. Pt was given bolus of ns multiple times. Hyponatremia corrected to 129. Pt will follow up with pcp outpatient.    #HTN (hypertension).   ·  Plan: Receoved home metoprolol 12.5mg qd.    #HLD (hyperlipidemia).   ·  Plan: Received lipitor 40mg qd (alternate for home Crestor 20mg qd).    #Hypothyroid.   ·  Plan: TSH at 10.160. Home med synthroid 50mcg. Repeat tsh at 6.990. Endocrinology consulted, recommended synthroid 75mcg.    #History of BPH.   ·  Plan: per Daughter, pt's home Flomax d/c recently as was not beneficial. Pt has been voiding on his own.    #CANDELARIA (iron deficiency anemia).   ·  Plan; Total iron at 28, TIBC at 208, Iron saturation at 13, Ferritin at 622, transferrin at 161. Pt will follow up with pcp outpatient.    New medications/therapies: cefpodoxime 200mg BID until 10/23 flagyl 500mg TID until 10/23, synthroid 75mcg, januvia 100mg qD  New lines/hardware: none  Labs to be followed outpatient: none  Exam to be followed outpatient: none    Discharge plan: discharge to home    Physical Exam Upon Discharge:  General: in no acute distress  Eyes: EOMI intact bilaterally  HENT: Moist mucous membranes  Neck: Trachea midline  Lungs: CTA B/L. No wheezes, rales, or rhonchi  Cardiovascular: RRR. No murmurs, rubs, or gallops  Abdomen: Soft, non-tender non-distended  Extremities: WWP, No clubbing, cyanosis or edema  Neurological: Alert and oriented  Skin: Warm and dry     #Discharge: do not delete    Patient is a 84M, (shellfish rxn w/ Lip Swelling ~ 20 yrs ago),  w/ PMHx CAD s/p JOSÉ to mLAD (6/2023), HTN, HLD, cervical spondylosis s/p laminectomy, gastric ulcers, hypothyroidism,  who presents to [facility] ED 10/6/23 for BELLO with minimal exertion, chest pressure, and orthopnea x 3 days. EKG non ischemic, Troponin T negative. Echo revealed mildly dilated right ventricular size with mildly reduced right ventricular systolic function. RUQUS revealed cholelithiasis, CTAP with acute cholecystitis with fat stranding, HIDA had non visualization of the gallbladder indicating cholecystis. General surgery consulted and recommended no surgical intervention, percutaneous cholecystostomy and abx. IR consulted, and pt at bleed risk secondary to clopidogrel. Pt received ctx 2g and flagyl. Leukocytes downtrending, pt has been clinically stable with mild abdominal pain. Pt will continue abx until 10/22. Pt will follow up with Dr. Gomez outpatient.      Problem List/Main Diagnoses:   #Acute hypoxic respiratory failure.   ·  Plan: Presents w/ BELLO w/ minimal exertion, chest pain, orthopnea (2 pillow use)  x 3 days. proBNP 1279. Prior hx CAD: JOSÉ mLAD in 6/2023. Compliant w/ DAPT aspirin/Plavix. Received Lasix 20mg IV x 1 in ED. EKG nonischemic. Troponin T x 1 negative. s/p lasix 50mg IVP. Cath denied.  Pt presented with acs sxs initially and admitted to cardiology service to r/o cardiac causes. Pt initally required oxygen, and was thought to be secondary to acute chf excerbation. Pt found to have cholecystits on CTAP and sxs are likely due to cholecystits. Pt is not complaining of shortness of breath or chest pain. Currently on 2L. CXR revealed right hemidiaphragm. Acute hypoxic respiratory failure likely secondary to cholecystitis pain and hemidiaphragm. Pt will go home with home oxygen. Pt will follow up outpatient.    #Abdominal pain.   ·  Plan: Reports sharp pinpoint RUQ discomfort worsened with palpation x 1-2 days. Last BM 10/3 w/ suppository at home. Daughter reports decreased eating and fluid intake at home lately. RUQUS reveled cholelithiasis, CTAP revealed acute cholecystitis with fat stranding, HIDA had non visualization of the gallbladder. Pt received ceftriaxone and flagyl during admission. Pt will need to continue cefpodoxime and flagyl at home. Pt will follow up with Dr. Gomez.    #Leukocytosis.   ·  Plan: WBC 16.26 elevated on admission. Reported fever at home yesterday improved w/ Tylenol. Likely in the setting of cholecystitis. procal 0.25 elevated s/p abx IV Ceftriaxone x 5 days and Azithromycin x 1 in ED. RUQUS, CTAP and HIDA revealed cholecystitis. Pt continued on ceftriaxone and flagyl. Pt will be discharged with  cefpodoxime and flagyl to complete full course. Pt will follow up with Dr. Gomez outpatient.    #Diabetes mellitus.   ·  Plan: found to have elevated glucose on BMP  A1c found to be 7.7. no hx of diabetes. Endocrinology consulted, pt received moderate dose sliding scale and trajenta 5mg qAM. Pt will be discharged with trajenta 5mg. Pt will follow up with pcp outpatient.    #Hyponatremia.   ·  Plan: Na 126 on admission. On lasix 40mg IV qD. Likely hypovolemic hyponatremia iso of decreased po intake vs diuretic(presented with na 126) vs siadh iso of pain. Feurea 50.2%. Nephrology consulted. Pt was given bolus of ns multiple times. Hyponatremia corrected to 129. Pt will follow up with pcp outpatient.    #HTN (hypertension).   ·  Plan: Receoved home metoprolol 12.5mg qd.    #HLD (hyperlipidemia).   ·  Plan: Received lipitor 40mg qd (alternate for home Crestor 20mg qd).    #Hypothyroid.   ·  Plan: TSH at 10.160. Home med synthroid 50mcg. Repeat tsh at 6.990. Endocrinology consulted, recommended synthroid 75mcg.    #History of BPH.   ·  Plan: per Daughter, pt's home Flomax d/c recently as was not beneficial. Pt has been voiding on his own.    #CANDELARIA (iron deficiency anemia).   ·  Plan; Total iron at 28, TIBC at 208, Iron saturation at 13, Ferritin at 622, transferrin at 161. Pt will follow up with pcp outpatient.    New medications/therapies: cefpodoxime 200mg BID until 10/23 flagyl 500mg TID until 10/23, synthroid 75mcg, januvia 100mg qD  New lines/hardware: none  Labs to be followed outpatient: none  Exam to be followed outpatient: none    Discharge plan: discharge to home    Physical Exam Upon Discharge:  General: in no acute distress  Eyes: EOMI intact bilaterally  HENT: Moist mucous membranes  Neck: Trachea midline  Lungs: CTA B/L. No wheezes, rales, or rhonchi  Cardiovascular: RRR. No murmurs, rubs, or gallops  Abdomen: Soft, non-tender non-distended  Extremities: WWP, No clubbing, cyanosis or edema  Neurological: Alert and oriented  Skin: Warm and dry     #Discharge: do not delete    Patient is a 84M, (shellfish rxn w/ Lip Swelling ~ 20 yrs ago),  w/ PMHx CAD s/p JOSÉ to mLAD (6/2023), HTN, HLD, cervical spondylosis s/p laminectomy, gastric ulcers, hypothyroidism,  who presents to [facility] ED 10/6/23 for BELLO with minimal exertion, chest pressure, and orthopnea x 3 days. EKG non ischemic, Troponin T negative. Echo revealed mildly dilated right ventricular size with mildly reduced right ventricular systolic function. RUQUS revealed cholelithiasis, CTAP with acute cholecystitis with fat stranding, HIDA had non visualization of the gallbladder indicating cholecystis. General surgery consulted and recommended no surgical intervention, percutaneous cholecystostomy and abx. IR consulted, and pt at bleed risk secondary to clopidogrel. Pt received ctx 2g and flagyl. Leukocytes downtrending, pt has been clinically stable with mild abdominal pain. Pt will continue abx until 10/22. Pt will follow up with Dr. Gomez outpatient.      Problem List/Main Diagnoses:   #Acute hypoxic respiratory failure.   ·  Plan: Presents w/ BELLO w/ minimal exertion, chest pain, orthopnea (2 pillow use)  x 3 days. proBNP 1279. Prior hx CAD: JOSÉ mLAD in 6/2023. Compliant w/ DAPT aspirin/Plavix. Received Lasix 20mg IV x 1 in ED. EKG nonischemic. Troponin T x 1 negative. s/p lasix 50mg IVP. Cath denied.  Pt presented with acs sxs initially and admitted to cardiology service to r/o cardiac causes. Pt initally required oxygen, and was thought to be secondary to acute chf excerbation. Pt found to have cholecystits on CTAP and sxs are likely due to cholecystits. Pt is not complaining of shortness of breath or chest pain. Currently on 2L. CXR revealed right hemidiaphragm. Acute hypoxic respiratory failure likely secondary to cholecystitis pain and hemidiaphragm. Pt will go home with home oxygen. Pt will follow up outpatient.    #Abdominal pain.   ·  Plan: Reports sharp pinpoint RUQ discomfort worsened with palpation x 1-2 days. Last BM 10/3 w/ suppository at home. Daughter reports decreased eating and fluid intake at home lately. RUQUS reveled cholelithiasis, CTAP revealed acute cholecystitis with fat stranding, HIDA had non visualization of the gallbladder. Pt received ceftriaxone and flagyl during admission. Pt will need to continue cefpodoxime and flagyl at home. Pt will follow up with Dr. Gomez.    #Leukocytosis.   ·  Plan: WBC 16.26 elevated on admission. Reported fever at home yesterday improved w/ Tylenol. Likely in the setting of cholecystitis. procal 0.25 elevated s/p abx IV Ceftriaxone x 5 days and Azithromycin x 1 in ED. RUQUS, CTAP and HIDA revealed cholecystitis. Pt continued on ceftriaxone and flagyl. Pt will be discharged with  cefpodoxime and flagyl to complete full course. Pt will follow up with Dr. Gomez outpatient.    #Diabetes mellitus.   ·  Plan: found to have elevated glucose on BMP  A1c found to be 7.7. no hx of diabetes. Endocrinology consulted, pt received moderate dose sliding scale and trajenta 5mg qAM. Pt will be discharged with trajenta 5mg. Pt will follow up with pcp outpatient.    #Hyponatremia.   ·  Plan: Na 126 on admission. On lasix 40mg IV qD. Likely hypovolemic hyponatremia iso of decreased po intake vs diuretic(presented with na 126) vs siadh iso of pain. Feurea 50.2%. Nephrology consulted. Pt was given bolus of ns multiple times. Hyponatremia corrected to 129. Pt will follow up with pcp outpatient.    #HTN (hypertension).   ·  Plan: Receoved home metoprolol 12.5mg qd.    #HLD (hyperlipidemia).   ·  Plan: Received lipitor 40mg qd (alternate for home Crestor 20mg qd).    #Hypothyroid.   ·  Plan: TSH at 10.160. Home med synthroid 50mcg. Repeat tsh at 6.990. Endocrinology consulted, recommended synthroid 75mcg.    #History of BPH.   ·  Plan: per Daughter, pt's home Flomax d/c recently as was not beneficial. Pt has been voiding on his own.    #CANDELARIA (iron deficiency anemia).   ·  Plan; Total iron at 28, TIBC at 208, Iron saturation at 13, Ferritin at 622, transferrin at 161. Pt will follow up with pcp outpatient.    New medications/therapies: cefpodoxime 200mg BID until 10/23 flagyl 500mg TID until 10/23, synthroid 75mcg, januvia 100mg qD  New lines/hardware: none  Labs to be followed outpatient: none  Exam to be followed outpatient: none    Discharge plan: discharge to home    Physical Exam Upon Discharge:  General: in no acute distress  Eyes: EOMI intact bilaterally  HENT: Moist mucous membranes  Neck: Trachea midline  Lungs: CTA B/L. No wheezes, rales, or rhonchi  Cardiovascular: RRR. No murmurs, rubs, or gallops  Abdomen: Soft, non-tender non-distended  Extremities: WWP, No clubbing, cyanosis or edema  Neurological: Alert and oriented  Skin: Warm and dry

## 2024-12-24 PROBLEM — F10.90 ALCOHOL USE: Status: INACTIVE | Noted: 2018-01-26

## 2025-01-08 ENCOUNTER — APPOINTMENT (OUTPATIENT)
Dept: CARDIOLOGY | Facility: CLINIC | Age: 70
End: 2025-01-08

## 2025-01-08 ENCOUNTER — NON-APPOINTMENT (OUTPATIENT)
Age: 70
End: 2025-01-08

## 2025-01-08 VITALS
WEIGHT: 170 LBS | HEART RATE: 69 BPM | BODY MASS INDEX: 25.76 KG/M2 | OXYGEN SATURATION: 99 % | DIASTOLIC BLOOD PRESSURE: 81 MMHG | HEIGHT: 68 IN | SYSTOLIC BLOOD PRESSURE: 125 MMHG

## 2025-01-08 PROCEDURE — G2211 COMPLEX E/M VISIT ADD ON: CPT

## 2025-01-08 PROCEDURE — 93000 ELECTROCARDIOGRAM COMPLETE: CPT

## 2025-01-08 PROCEDURE — 99214 OFFICE O/P EST MOD 30 MIN: CPT

## 2025-03-03 NOTE — ASU PATIENT PROFILE, ADULT - NS PRO INFO GIVEN TO
Patient is requesting an increase dose of wegovy to 1.7 mg. Please send new script, if appropriate.    patient

## (undated) DEVICE — UNDERPAD LINEN SAVER 17 X 24"

## (undated) DEVICE — CONTAINER FORMALIN 80ML YELLOW

## (undated) DEVICE — LINE BREATHE SAMPLNG

## (undated) DEVICE — TUBING MEDI-VAC W MAXIGRIP CONNECTORS 1/4"X6'

## (undated) DEVICE — DRSG CURITY GAUZE SPONGE 4 X 4" 12-PLY NON-STERILE

## (undated) DEVICE — CATH IV SAFE BC 22G X 1" (BLUE)

## (undated) DEVICE — LUBRICATING JELLY HR ONE SHOT 3G

## (undated) DEVICE — DENTURE CUP PINK

## (undated) DEVICE — SALIVA EJECTOR (BLUE)

## (undated) DEVICE — PACK IV START WITH CHG

## (undated) DEVICE — TUBING IV SET GRAVITY 3Y 100" MACRO

## (undated) DEVICE — POLY TRAP ETRAP

## (undated) DEVICE — DRSG 2X2

## (undated) DEVICE — ELCTR ECG CONDUCTIVE ADHESIVE

## (undated) DEVICE — SNARE EXACTO COLD 9MMX230CM

## (undated) DEVICE — FORCEP RADIAL JAW 4 JUMBO 2.8MM 3.2MM 240CM ORANGE DISP

## (undated) DEVICE — CONTAINER FORMALIN 10% 20ML

## (undated) DEVICE — BIOPSY FORCEP COLD DISP

## (undated) DEVICE — CLAMP BX HOT RAD JAW 3

## (undated) DEVICE — BITE BLOCK ADULT 20 X 27MM (GREEN)

## (undated) DEVICE — FACESHIELD FULL VISOR

## (undated) DEVICE — BIOPSY FORCEP RADIAL JAW 4 STANDARD WITH NEEDLE

## (undated) DEVICE — BASIN EMESIS 10IN GRADUATED MAUVE

## (undated) DEVICE — TUBING SUCTION NONCONDUCTIVE 6MM X 12FT

## (undated) DEVICE — DRSG BANDAID 0.75X3"

## (undated) DEVICE — ELCTR GROUNDING PAD ADULT COVIDIEN

## (undated) DEVICE — GOWN LG